# Patient Record
Sex: FEMALE | Race: WHITE | NOT HISPANIC OR LATINO | URBAN - METROPOLITAN AREA
[De-identification: names, ages, dates, MRNs, and addresses within clinical notes are randomized per-mention and may not be internally consistent; named-entity substitution may affect disease eponyms.]

---

## 2017-04-03 ENCOUNTER — OUTPATIENT (OUTPATIENT)
Dept: OUTPATIENT SERVICES | Facility: HOSPITAL | Age: 82
LOS: 1 days | Discharge: HOME | End: 2017-04-03

## 2017-06-27 DIAGNOSIS — I25.10 ATHEROSCLEROTIC HEART DISEASE OF NATIVE CORONARY ARTERY WITHOUT ANGINA PECTORIS: ICD-10-CM

## 2017-06-27 DIAGNOSIS — Z01.810 ENCOUNTER FOR PREPROCEDURAL CARDIOVASCULAR EXAMINATION: ICD-10-CM

## 2017-06-27 DIAGNOSIS — E78.00 PURE HYPERCHOLESTEROLEMIA, UNSPECIFIED: ICD-10-CM

## 2017-06-27 DIAGNOSIS — Z79.899 OTHER LONG TERM (CURRENT) DRUG THERAPY: ICD-10-CM

## 2017-07-10 ENCOUNTER — OUTPATIENT (OUTPATIENT)
Dept: OUTPATIENT SERVICES | Facility: HOSPITAL | Age: 82
LOS: 1 days | Discharge: HOME | End: 2017-07-10

## 2017-07-10 DIAGNOSIS — Z12.31 ENCOUNTER FOR SCREENING MAMMOGRAM FOR MALIGNANT NEOPLASM OF BREAST: ICD-10-CM

## 2017-07-10 DIAGNOSIS — R00.1 BRADYCARDIA, UNSPECIFIED: ICD-10-CM

## 2017-07-12 ENCOUNTER — OUTPATIENT (OUTPATIENT)
Dept: OUTPATIENT SERVICES | Facility: HOSPITAL | Age: 82
LOS: 1 days | Discharge: HOME | End: 2017-07-12

## 2017-07-12 DIAGNOSIS — R00.1 BRADYCARDIA, UNSPECIFIED: ICD-10-CM

## 2017-07-13 DIAGNOSIS — N39.0 URINARY TRACT INFECTION, SITE NOT SPECIFIED: ICD-10-CM

## 2017-10-04 ENCOUNTER — OUTPATIENT (OUTPATIENT)
Dept: OUTPATIENT SERVICES | Facility: HOSPITAL | Age: 82
LOS: 1 days | Discharge: HOME | End: 2017-10-04

## 2017-10-04 DIAGNOSIS — Z79.899 OTHER LONG TERM (CURRENT) DRUG THERAPY: ICD-10-CM

## 2017-10-04 DIAGNOSIS — E78.00 PURE HYPERCHOLESTEROLEMIA, UNSPECIFIED: ICD-10-CM

## 2017-10-04 DIAGNOSIS — R00.1 BRADYCARDIA, UNSPECIFIED: ICD-10-CM

## 2017-10-04 DIAGNOSIS — I25.10 ATHEROSCLEROTIC HEART DISEASE OF NATIVE CORONARY ARTERY WITHOUT ANGINA PECTORIS: ICD-10-CM

## 2017-10-04 DIAGNOSIS — Z01.810 ENCOUNTER FOR PREPROCEDURAL CARDIOVASCULAR EXAMINATION: ICD-10-CM

## 2017-12-28 ENCOUNTER — RX RENEWAL (OUTPATIENT)
Age: 82
End: 2017-12-28

## 2017-12-28 DIAGNOSIS — N32.81 OVERACTIVE BLADDER: ICD-10-CM

## 2017-12-28 PROBLEM — Z00.00 ENCOUNTER FOR PREVENTIVE HEALTH EXAMINATION: Status: ACTIVE | Noted: 2017-12-28

## 2018-02-01 ENCOUNTER — APPOINTMENT (OUTPATIENT)
Dept: OTOLARYNGOLOGY | Facility: CLINIC | Age: 83
End: 2018-02-01

## 2018-04-03 ENCOUNTER — OUTPATIENT (OUTPATIENT)
Dept: OUTPATIENT SERVICES | Facility: HOSPITAL | Age: 83
LOS: 1 days | Discharge: HOME | End: 2018-04-03

## 2018-04-03 DIAGNOSIS — E78.5 HYPERLIPIDEMIA, UNSPECIFIED: ICD-10-CM

## 2018-04-09 ENCOUNTER — OUTPATIENT (OUTPATIENT)
Dept: OUTPATIENT SERVICES | Facility: HOSPITAL | Age: 83
LOS: 1 days | Discharge: HOME | End: 2018-04-09

## 2018-04-09 DIAGNOSIS — Z01.810 ENCOUNTER FOR PREPROCEDURAL CARDIOVASCULAR EXAMINATION: ICD-10-CM

## 2018-04-09 DIAGNOSIS — Z79.899 OTHER LONG TERM (CURRENT) DRUG THERAPY: ICD-10-CM

## 2018-04-09 DIAGNOSIS — I25.10 ATHEROSCLEROTIC HEART DISEASE OF NATIVE CORONARY ARTERY WITHOUT ANGINA PECTORIS: ICD-10-CM

## 2018-04-09 DIAGNOSIS — E78.00 PURE HYPERCHOLESTEROLEMIA, UNSPECIFIED: ICD-10-CM

## 2018-04-26 ENCOUNTER — APPOINTMENT (OUTPATIENT)
Dept: OTOLARYNGOLOGY | Facility: CLINIC | Age: 83
End: 2018-04-26
Payer: MEDICARE

## 2018-04-26 VITALS
HEIGHT: 64 IN | DIASTOLIC BLOOD PRESSURE: 90 MMHG | SYSTOLIC BLOOD PRESSURE: 128 MMHG | BODY MASS INDEX: 21.34 KG/M2 | WEIGHT: 125 LBS

## 2018-04-26 DIAGNOSIS — R42 DIZZINESS AND GIDDINESS: ICD-10-CM

## 2018-04-26 DIAGNOSIS — Z78.9 OTHER SPECIFIED HEALTH STATUS: ICD-10-CM

## 2018-04-26 DIAGNOSIS — H91.90 UNSPECIFIED HEARING LOSS, UNSPECIFIED EAR: ICD-10-CM

## 2018-04-26 DIAGNOSIS — H61.20 IMPACTED CERUMEN, UNSPECIFIED EAR: ICD-10-CM

## 2018-04-26 PROCEDURE — 99203 OFFICE O/P NEW LOW 30 MIN: CPT | Mod: 25

## 2018-04-26 PROCEDURE — 69210 REMOVE IMPACTED EAR WAX UNI: CPT

## 2018-04-26 RX ORDER — FAMOTIDINE 40 MG/1
40 TABLET, FILM COATED ORAL
Qty: 90 | Refills: 0 | Status: ACTIVE | COMMUNITY
Start: 2018-03-29

## 2018-04-26 RX ORDER — HYDROCHLOROTHIAZIDE 12.5 MG/1
12.5 CAPSULE ORAL
Qty: 30 | Refills: 0 | Status: ACTIVE | COMMUNITY
Start: 2018-04-23

## 2018-04-26 RX ORDER — LISINOPRIL 40 MG/1
40 TABLET ORAL
Qty: 90 | Refills: 0 | Status: ACTIVE | COMMUNITY
Start: 2018-02-21

## 2018-04-26 RX ORDER — ATORVASTATIN CALCIUM 10 MG/1
10 TABLET, FILM COATED ORAL
Qty: 90 | Refills: 0 | Status: ACTIVE | COMMUNITY
Start: 2018-02-07

## 2018-04-26 RX ORDER — METOPROLOL TARTRATE 25 MG/1
25 TABLET, FILM COATED ORAL
Qty: 180 | Refills: 0 | Status: ACTIVE | COMMUNITY
Start: 2017-05-03 | End: 1900-01-01

## 2018-04-26 RX ORDER — MECLIZINE HYDROCHLORIDE 25 MG/1
25 TABLET ORAL
Qty: 60 | Refills: 0 | Status: ACTIVE | COMMUNITY
Start: 2018-03-26

## 2018-04-27 ENCOUNTER — APPOINTMENT (OUTPATIENT)
Dept: OTOLARYNGOLOGY | Facility: CLINIC | Age: 83
End: 2018-04-27

## 2018-05-29 ENCOUNTER — OUTPATIENT (OUTPATIENT)
Dept: OUTPATIENT SERVICES | Facility: HOSPITAL | Age: 83
LOS: 1 days | Discharge: HOME | End: 2018-05-29

## 2018-05-30 DIAGNOSIS — H53.2 DIPLOPIA: ICD-10-CM

## 2018-05-30 DIAGNOSIS — H50.53 VERTICAL HETEROPHORIA: ICD-10-CM

## 2018-05-30 DIAGNOSIS — H25.813 COMBINED FORMS OF AGE-RELATED CATARACT, BILATERAL: ICD-10-CM

## 2018-06-06 ENCOUNTER — APPOINTMENT (OUTPATIENT)
Dept: OTOLARYNGOLOGY | Facility: CLINIC | Age: 83
End: 2018-06-06

## 2018-08-27 ENCOUNTER — OUTPATIENT (OUTPATIENT)
Dept: OUTPATIENT SERVICES | Facility: HOSPITAL | Age: 83
LOS: 1 days | Discharge: HOME | End: 2018-08-27

## 2018-08-27 DIAGNOSIS — I25.10 ATHEROSCLEROTIC HEART DISEASE OF NATIVE CORONARY ARTERY WITHOUT ANGINA PECTORIS: ICD-10-CM

## 2018-08-27 DIAGNOSIS — Z01.810 ENCOUNTER FOR PREPROCEDURAL CARDIOVASCULAR EXAMINATION: ICD-10-CM

## 2018-08-27 DIAGNOSIS — E78.00 PURE HYPERCHOLESTEROLEMIA, UNSPECIFIED: ICD-10-CM

## 2018-08-27 DIAGNOSIS — Z79.899 OTHER LONG TERM (CURRENT) DRUG THERAPY: ICD-10-CM

## 2018-08-29 ENCOUNTER — OUTPATIENT (OUTPATIENT)
Dept: OUTPATIENT SERVICES | Facility: HOSPITAL | Age: 83
LOS: 1 days | Discharge: HOME | End: 2018-08-29

## 2018-12-02 ENCOUNTER — TRANSCRIPTION ENCOUNTER (OUTPATIENT)
Age: 83
End: 2018-12-02

## 2019-02-23 ENCOUNTER — TRANSCRIPTION ENCOUNTER (OUTPATIENT)
Age: 84
End: 2019-02-23

## 2019-03-08 ENCOUNTER — RX RENEWAL (OUTPATIENT)
Age: 84
End: 2019-03-08

## 2019-05-03 ENCOUNTER — TRANSCRIPTION ENCOUNTER (OUTPATIENT)
Age: 84
End: 2019-05-03

## 2019-06-10 ENCOUNTER — RX RENEWAL (OUTPATIENT)
Age: 84
End: 2019-06-10

## 2019-07-01 ENCOUNTER — OUTPATIENT (OUTPATIENT)
Dept: OUTPATIENT SERVICES | Facility: HOSPITAL | Age: 84
LOS: 1 days | Discharge: HOME | End: 2019-07-01

## 2019-07-01 DIAGNOSIS — K02.63 DENTAL CARIES ON SMOOTH SURFACE PENETRATING INTO PULP: ICD-10-CM

## 2019-07-26 ENCOUNTER — OUTPATIENT (OUTPATIENT)
Dept: OUTPATIENT SERVICES | Facility: HOSPITAL | Age: 84
LOS: 1 days | Discharge: HOME | End: 2019-07-26

## 2019-08-15 ENCOUNTER — OUTPATIENT (OUTPATIENT)
Dept: OUTPATIENT SERVICES | Facility: HOSPITAL | Age: 84
LOS: 1 days | Discharge: HOME | End: 2019-08-15

## 2019-08-15 DIAGNOSIS — R39.15 URGENCY OF URINATION: ICD-10-CM

## 2019-08-15 DIAGNOSIS — R42 DIZZINESS AND GIDDINESS: ICD-10-CM

## 2019-08-15 DIAGNOSIS — K22.70 BARRETT'S ESOPHAGUS WITHOUT DYSPLASIA: ICD-10-CM

## 2019-08-15 DIAGNOSIS — E78.5 HYPERLIPIDEMIA, UNSPECIFIED: ICD-10-CM

## 2019-09-16 ENCOUNTER — TRANSCRIPTION ENCOUNTER (OUTPATIENT)
Age: 84
End: 2019-09-16

## 2020-07-10 ENCOUNTER — RX RENEWAL (OUTPATIENT)
Age: 85
End: 2020-07-10

## 2020-09-13 ENCOUNTER — TRANSCRIPTION ENCOUNTER (OUTPATIENT)
Age: 85
End: 2020-09-13

## 2020-10-12 ENCOUNTER — RX RENEWAL (OUTPATIENT)
Age: 85
End: 2020-10-12

## 2021-01-12 ENCOUNTER — RX RENEWAL (OUTPATIENT)
Age: 86
End: 2021-01-12

## 2021-01-28 ENCOUNTER — RX RENEWAL (OUTPATIENT)
Age: 86
End: 2021-01-28

## 2021-07-11 ENCOUNTER — EMERGENCY (EMERGENCY)
Facility: HOSPITAL | Age: 86
LOS: 0 days | Discharge: HOME | End: 2021-07-11
Attending: EMERGENCY MEDICINE | Admitting: EMERGENCY MEDICINE
Payer: MEDICARE

## 2021-07-11 VITALS
RESPIRATION RATE: 18 BRPM | OXYGEN SATURATION: 98 % | DIASTOLIC BLOOD PRESSURE: 105 MMHG | HEART RATE: 76 BPM | TEMPERATURE: 99 F | SYSTOLIC BLOOD PRESSURE: 221 MMHG

## 2021-07-11 VITALS
HEART RATE: 80 BPM | TEMPERATURE: 99 F | DIASTOLIC BLOOD PRESSURE: 87 MMHG | SYSTOLIC BLOOD PRESSURE: 188 MMHG | RESPIRATION RATE: 18 BRPM | OXYGEN SATURATION: 97 %

## 2021-07-11 DIAGNOSIS — S01.81XA LACERATION WITHOUT FOREIGN BODY OF OTHER PART OF HEAD, INITIAL ENCOUNTER: ICD-10-CM

## 2021-07-11 DIAGNOSIS — E78.5 HYPERLIPIDEMIA, UNSPECIFIED: ICD-10-CM

## 2021-07-11 DIAGNOSIS — Z23 ENCOUNTER FOR IMMUNIZATION: ICD-10-CM

## 2021-07-11 DIAGNOSIS — I10 ESSENTIAL (PRIMARY) HYPERTENSION: ICD-10-CM

## 2021-07-11 DIAGNOSIS — S02.2XXA FRACTURE OF NASAL BONES, INITIAL ENCOUNTER FOR CLOSED FRACTURE: ICD-10-CM

## 2021-07-11 DIAGNOSIS — S41.011A LACERATION WITHOUT FOREIGN BODY OF RIGHT SHOULDER, INITIAL ENCOUNTER: ICD-10-CM
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
## 2021-07-11 LAB
ALBUMIN SERPL ELPH-MCNC: 4.5 G/DL — SIGNIFICANT CHANGE UP (ref 3.5–5.2)
ALP SERPL-CCNC: 91 U/L — SIGNIFICANT CHANGE UP (ref 30–115)
ALT FLD-CCNC: 6 U/L — SIGNIFICANT CHANGE UP (ref 0–41)
ANION GAP SERPL CALC-SCNC: 11 MMOL/L — SIGNIFICANT CHANGE UP (ref 7–14)
APTT BLD: 23.9 SEC — CRITICAL LOW (ref 27–39.2)
AST SERPL-CCNC: 16 U/L — SIGNIFICANT CHANGE UP (ref 0–41)
BASOPHILS # BLD AUTO: 0.06 K/UL — SIGNIFICANT CHANGE UP (ref 0–0.2)
BASOPHILS NFR BLD AUTO: 0.7 % — SIGNIFICANT CHANGE UP (ref 0–1)
BILIRUB SERPL-MCNC: <0.2 MG/DL — SIGNIFICANT CHANGE UP (ref 0.2–1.2)
BUN SERPL-MCNC: 24 MG/DL — HIGH (ref 10–20)
CALCIUM SERPL-MCNC: 9.7 MG/DL — SIGNIFICANT CHANGE UP (ref 8.5–10.1)
CHLORIDE SERPL-SCNC: 106 MMOL/L — SIGNIFICANT CHANGE UP (ref 98–110)
CO2 SERPL-SCNC: 25 MMOL/L — SIGNIFICANT CHANGE UP (ref 17–32)
CREAT SERPL-MCNC: 1.1 MG/DL — SIGNIFICANT CHANGE UP (ref 0.7–1.5)
EOSINOPHIL # BLD AUTO: 0.21 K/UL — SIGNIFICANT CHANGE UP (ref 0–0.7)
EOSINOPHIL NFR BLD AUTO: 2.3 % — SIGNIFICANT CHANGE UP (ref 0–8)
GLUCOSE SERPL-MCNC: 163 MG/DL — HIGH (ref 70–99)
HCT VFR BLD CALC: 34.6 % — LOW (ref 37–47)
HGB BLD-MCNC: 11.2 G/DL — LOW (ref 12–16)
IMM GRANULOCYTES NFR BLD AUTO: 0.4 % — HIGH (ref 0.1–0.3)
INR BLD: 0.97 RATIO — SIGNIFICANT CHANGE UP (ref 0.65–1.3)
LACTATE SERPL-SCNC: 1.7 MMOL/L — SIGNIFICANT CHANGE UP (ref 0.7–2)
LIDOCAIN IGE QN: 28 U/L — SIGNIFICANT CHANGE UP (ref 7–60)
LYMPHOCYTES # BLD AUTO: 1.35 K/UL — SIGNIFICANT CHANGE UP (ref 1.2–3.4)
LYMPHOCYTES # BLD AUTO: 15.1 % — LOW (ref 20.5–51.1)
MCHC RBC-ENTMCNC: 26.4 PG — LOW (ref 27–31)
MCHC RBC-ENTMCNC: 32.4 G/DL — SIGNIFICANT CHANGE UP (ref 32–37)
MCV RBC AUTO: 81.6 FL — SIGNIFICANT CHANGE UP (ref 81–99)
MONOCYTES # BLD AUTO: 0.76 K/UL — HIGH (ref 0.1–0.6)
MONOCYTES NFR BLD AUTO: 8.5 % — SIGNIFICANT CHANGE UP (ref 1.7–9.3)
NEUTROPHILS # BLD AUTO: 6.55 K/UL — HIGH (ref 1.4–6.5)
NEUTROPHILS NFR BLD AUTO: 73 % — SIGNIFICANT CHANGE UP (ref 42.2–75.2)
NRBC # BLD: 0 /100 WBCS — SIGNIFICANT CHANGE UP (ref 0–0)
PLATELET # BLD AUTO: 220 K/UL — SIGNIFICANT CHANGE UP (ref 130–400)
POTASSIUM SERPL-MCNC: 3.6 MMOL/L — SIGNIFICANT CHANGE UP (ref 3.5–5)
POTASSIUM SERPL-SCNC: 3.6 MMOL/L — SIGNIFICANT CHANGE UP (ref 3.5–5)
PROT SERPL-MCNC: 6.9 G/DL — SIGNIFICANT CHANGE UP (ref 6–8)
PROTHROM AB SERPL-ACNC: 11.1 SEC — SIGNIFICANT CHANGE UP (ref 9.95–12.87)
RBC # BLD: 4.24 M/UL — SIGNIFICANT CHANGE UP (ref 4.2–5.4)
RBC # FLD: 14.4 % — SIGNIFICANT CHANGE UP (ref 11.5–14.5)
SODIUM SERPL-SCNC: 142 MMOL/L — SIGNIFICANT CHANGE UP (ref 135–146)
WBC # BLD: 8.97 K/UL — SIGNIFICANT CHANGE UP (ref 4.8–10.8)
WBC # FLD AUTO: 8.97 K/UL — SIGNIFICANT CHANGE UP (ref 4.8–10.8)

## 2021-07-11 PROCEDURE — 12011 RPR F/E/E/N/L/M 2.5 CM/<: CPT | Mod: 59

## 2021-07-11 PROCEDURE — 76705 ECHO EXAM OF ABDOMEN: CPT | Mod: 26

## 2021-07-11 PROCEDURE — 73502 X-RAY EXAM HIP UNI 2-3 VIEWS: CPT | Mod: 26,RT

## 2021-07-11 PROCEDURE — 99285 EMERGENCY DEPT VISIT HI MDM: CPT | Mod: 25

## 2021-07-11 PROCEDURE — 73552 X-RAY EXAM OF FEMUR 2/>: CPT | Mod: 26,RT

## 2021-07-11 PROCEDURE — 72125 CT NECK SPINE W/O DYE: CPT | Mod: 26,MA

## 2021-07-11 PROCEDURE — 73060 X-RAY EXAM OF HUMERUS: CPT | Mod: 26,RT

## 2021-07-11 PROCEDURE — 93308 TTE F-UP OR LMTD: CPT | Mod: 26

## 2021-07-11 PROCEDURE — 73110 X-RAY EXAM OF WRIST: CPT | Mod: 26,RT

## 2021-07-11 PROCEDURE — 71045 X-RAY EXAM CHEST 1 VIEW: CPT | Mod: 26

## 2021-07-11 PROCEDURE — 76604 US EXAM CHEST: CPT | Mod: 26

## 2021-07-11 PROCEDURE — 73080 X-RAY EXAM OF ELBOW: CPT | Mod: 26,RT

## 2021-07-11 PROCEDURE — 71260 CT THORAX DX C+: CPT | Mod: 26,MA

## 2021-07-11 PROCEDURE — 93010 ELECTROCARDIOGRAM REPORT: CPT

## 2021-07-11 PROCEDURE — 73090 X-RAY EXAM OF FOREARM: CPT | Mod: 26,RT

## 2021-07-11 PROCEDURE — 72170 X-RAY EXAM OF PELVIS: CPT | Mod: 26,59

## 2021-07-11 PROCEDURE — 70450 CT HEAD/BRAIN W/O DYE: CPT | Mod: 26,MA

## 2021-07-11 PROCEDURE — 74177 CT ABD & PELVIS W/CONTRAST: CPT | Mod: 26,MA

## 2021-07-11 PROCEDURE — 70486 CT MAXILLOFACIAL W/O DYE: CPT | Mod: 26,MA

## 2021-07-11 RX ORDER — ACETAMINOPHEN 500 MG
650 TABLET ORAL ONCE
Refills: 0 | Status: COMPLETED | OUTPATIENT
Start: 2021-07-11 | End: 2021-07-11

## 2021-07-11 RX ORDER — TETANUS TOXOID, REDUCED DIPHTHERIA TOXOID AND ACELLULAR PERTUSSIS VACCINE, ADSORBED 5; 2.5; 8; 8; 2.5 [IU]/.5ML; [IU]/.5ML; UG/.5ML; UG/.5ML; UG/.5ML
0.5 SUSPENSION INTRAMUSCULAR ONCE
Refills: 0 | Status: COMPLETED | OUTPATIENT
Start: 2021-07-11 | End: 2021-07-11

## 2021-07-11 RX ADMIN — TETANUS TOXOID, REDUCED DIPHTHERIA TOXOID AND ACELLULAR PERTUSSIS VACCINE, ADSORBED 0.5 MILLILITER(S): 5; 2.5; 8; 8; 2.5 SUSPENSION INTRAMUSCULAR at 18:54

## 2021-07-11 RX ADMIN — Medication 650 MILLIGRAM(S): at 21:30

## 2021-07-11 NOTE — ED PROVIDER NOTE - NS ED ROS FT
Constitutional:  See HPI  Eyes:  No visual changes  ENMT: + nose pain, bruising, swelling. No neck pain or stiffness  Cardiac:  + right lower anterior chest wall pain. No retrosternal chest pain  Respiratory:  No cough or respiratory distress.   GI:  No nausea, vomiting, diarrhea or abdominal pain.  :  No dysuria, frequency or burning.  MS:  + R humerus, lower arm/wrist/hand pain. + R hip/thigh pain. No back pain.  Neuro:  No headache   Skin:  + laceration to R forehead, + multiple skin tears to RUE. No skin rash  Except as documented in the HPI,  all other systems are negative

## 2021-07-11 NOTE — ED PROVIDER NOTE - PROGRESS NOTE DETAILS
pt given ct results. will f/u as needed. pt ambulating, pain controlled, vitals normal.  pt's family agrees with plan.

## 2021-07-11 NOTE — ED ADULT TRIAGE NOTE - CHIEF COMPLAINT QUOTE
Pt stood up at home and fell, +HT. Pt reports it happened so fast she doesn't remember if she was dizzy. Laceration above left eye, abrasions to right arm. No blood thinners.

## 2021-07-11 NOTE — ED PROVIDER NOTE - PATIENT PORTAL LINK FT
You can access the FollowMyHealth Patient Portal offered by [facility] by registering at the following website: [email or web address]. By joining [employer]’s FollowMyHealth portal, you will also be able to view your health information using other applications (apps) compatible with our system.

## 2021-07-11 NOTE — ED PROVIDER NOTE - ATTENDING CONTRIBUTION TO CARE
89 yo F pmh of htn, hld presents after a mechnical fall. Patient was at home asleep in a recliner chair when the phone rang. The patient got up to walk to the kitchen and tripped on the lip on the floor into the kitchen. Fell onto her right side. + head trauma, unknown loc. + laceration to the right forhead. Having pain to the right ribs. no headache, no dizziness, no chest pain, no shortness of breath, no palpitations. no n/v, no numbness, tingling or wekaness. no blood thinning medications.     CONSTITUTIONAL: Well-developed; well-nourished; in no acute distress.   SKIN: warm, dry. + skin tears to the right arm. + echymosis to the right thumb. 1cm laceration to the right forhead. echymosis to the nasal bridge.   HEAD: Normocephalic; + hematoma to the right forhead.   EYES: PERRL, EOMI, no conjunctival erythema  ENT: No nasal discharge; airway clear. no septal hematoma, no hemotynpanum.   NECK: Supple; non tender.  CARD: S1, S2 normal;  Regular rate and rhythm.   RESP: No wheezes, rales or rhonchi. + right rib tenderness, no crepitus.   ABD: soft non tender, non distended, no rebound or guarding  EXT: Normal ROM.  5/5 strength in all 4 extremities   LYMPH: No acute cervical adenopathy.  NEURO: Alert, oriented, grossly unremarkable. neurovascularly intact  PSYCH: Cooperative, appropriate.

## 2021-07-11 NOTE — ED PROCEDURE NOTE - POC US FAST ED US FINDINGS
no pneumothoraces/No intra-abdominal or pericardial free fluid/Other neg efast/No intra-abdominal or pericardial free fluid/Other

## 2021-07-11 NOTE — ED PROVIDER NOTE - CLINICAL SUMMARY MEDICAL DECISION MAKING FREE TEXT BOX
Patient presents after a mechnical fall. labs, xray, ct done. Found to have hematoma and nasal bone fx. Laceration repaired, tdap given. Incidental findings on ct scan and all findings discussed and printed for the patient. Patient able to ambulate at her baseline. Discharged with pmd follow up and return precautions.

## 2021-07-11 NOTE — ED PROCEDURE NOTE - ATTENDING CONTRIBUTION TO CARE
I personally supervised the study.  I reviewed the images and interpretation by the resident/ACP and have edited where appropriate.
I personally supervised the study.  I reviewed the images and interpretation by the resident/ACP and have edited where appropriate.
I was present for and supervised the key and critical aspects of the procedures performed during the care of the patient.

## 2021-07-11 NOTE — ED PROVIDER NOTE - OBJECTIVE STATEMENT
Pt is a 89F with a pmhx of HTN and HLD (not on blood thinner) presenting after a mechnical fall. Patient was at home asleep in a recliner chair when the phone rang. The patient got up to walk to the kitchen and tripped on the lip on the floor into the kitchen. Fell onto her right side. + head trauma, unknown loc. + laceration to the right forhead. Having pain to the right ribs. no headache, no dizziness, no chest pain, no shortness of breath, no palpitations. no n/v, no numbness, tingling or weakness.

## 2021-07-11 NOTE — ED PROVIDER NOTE - NSFOLLOWUPINSTRUCTIONS_ED_ALL_ED_FT
Nasal Fracture    A nasal fracture is a break or crack in the bones or cartilage of the nose. Minor breaks do not require treatment. These breaks usually heal on their own after about one month. Serious breaks may require surgery.     CAUSES  This injury is usually caused by a blunt injury to the nose. This type of injury often occurs from:    Contact sports.  Car accidents.  Falls.  Getting punched.    SYMPTOMS  Symptoms of this injury include:    Pain.  Swelling of the nose.  Bleeding from the nose.  Bruising around the nose or eyes. This may include having black eyes.  Crooked appearance of the nose.    DIAGNOSIS  This injury may be diagnosed with a physical exam. The health care provider will gently feel the nose for signs of broken bones. He or she will look inside the nostrils to make sure that there is not a blood-filled swelling on the dividing wall between the nostrils (septal hematoma). X-rays of the nose may not show a nasal fracture even when one is present. In some cases, X-rays or a CT scan may be done 1–5 days after the injury. Sometimes, the health care provider will want to wait until the swelling has gone down.    TREATMENT  Often, minor fractures that have caused no deformity do not require treatment. More serious fractures in which bones have moved out of position may require surgery, which will take place after the swelling is gone. Surgery will stabilize and align the fracture. In some cases, a health care provider may be able to reposition the bones without surgery. This may be done in the health care provider's office after medicine is given to numb the area (local anesthetic).    HOME CARE INSTRUCTIONS  If directed, apply ice to the injured area:  Put ice in a plastic bag.  Place a towel between your skin and the bag.  Leave the ice on for 20 minutes, 2–3 times per day.  Take over-the-counter and prescription medicines only as told by your health care provider.  If your nose starts to bleed, sit in an upright position while you squeeze the soft parts of your nose against the dividing wall between your nostrils (septum) for 10 minutes.  Try to avoid blowing your nose.  Return to your normal activities as told by your health care provider. Ask your health care provider what activities are safe for you.  Avoid contact sports for 3–4 weeks or as told by your health care provider.  Keep all follow-up visits as told by your health care provider. This is important.    SEEK MEDICAL CARE IF:  Your pain increases or becomes severe.  You continue to have nosebleeds.  The shape of your nose does not return to normal within 5 days.  You have pus draining out of your nose.    SEEK IMMEDIATE MEDICAL CARE IF:  You have bleeding from your nose that does not stop after you pinch your nostrils closed for 20 minutes and keep ice on your nose.  You have clear fluid draining out of your nose.  You notice a grape-like swelling on the septum. This swelling is a collection of blood (hematoma) that must be drained to help prevent infection.  You have difficulty moving your eyes.  You have repeated vomiting.    ADDITIONAL NOTES AND INSTRUCTIONS    Please follow up with your Primary MD in 24-48 hr.  Seek immediate medical care for any new/worsening signs or symptoms.     Fall Prevention in the Home, Adult  Falls can cause injuries. They can happen to people of all ages. There are many things you can do to make your home safe and to help prevent falls. Ask for help when making these changes, if needed.    What actions can I take to prevent falls?  General Instructions     Use good lighting in all rooms. Replace any light bulbs that burn out.  Turn on the lights when you go into a dark area. Use night-lights.  Keep items that you use often in easy-to-reach places. Lower the shelves around your home if necessary.  Set up your furniture so you have a clear path. Avoid moving your furniture around.  Do not have throw rugs and other things on the floor that can make you trip.  Avoid walking on wet floors.  If any of your floors are uneven, fix them.  Add color or contrast paint or tape to clearly christian and help you see:  Any grab bars or handrails.  First and last steps of stairways.  Where the edge of each step is.  If you use a stepladder:  Make sure that it is fully opened. Do not climb a closed stepladder.  Make sure that both sides of the stepladder are locked into place.  Ask someone to hold the stepladder for you while you use it.  If there are any pets around you, be aware of where they are.  What can I do in the bathroom?     Keep the floor dry. Clean up any water that spills onto the floor as soon as it happens.  Remove soap buildup in the tub or shower regularly.  Use non-skid mats or decals on the floor of the tub or shower.  Attach bath mats securely with double-sided, non-slip rug tape.  If you need to sit down in the shower, use a plastic, non-slip stool.  Install grab bars by the toilet and in the tub and shower. Do not use towel bars as grab bars.  What can I do in the bedroom?     Make sure that you have a light by your bed that is easy to reach.  Do not use any sheets or blankets that are too big for your bed. They should not hang down onto the floor.  Have a firm chair that has side arms. You can use this for support while you get dressed.  What can I do in the kitchen?     Clean up any spills right away.  If you need to reach something above you, use a strong step stool that has a grab bar.  Keep electrical cords out of the way.  Do not use floor polish or wax that makes floors slippery. If you must use wax, use non-skid floor wax.  What can I do with my stairs?     Do not leave any items on the stairs.  Make sure that you have a light switch at the top of the stairs and the bottom of the stairs. If you do not have them, ask someone to add them for you.  Make sure that there are handrails on both sides of the stairs, and use them. Fix handrails that are broken or loose. Make sure that handrails are as long as the stairways.  Install non-slip stair treads on all stairs in your home.  Avoid having throw rugs at the top or bottom of the stairs. If you do have throw rugs, attach them to the floor with carpet tape.  Choose a carpet that does not hide the edge of the steps on the stairway.  Check any carpeting to make sure that it is firmly attached to the stairs. Fix any carpet that is loose or worn.  What can I do on the outside of my home?     Use bright outdoor lighting.  Regularly fix the edges of walkways and driveways and fix any cracks.  Remove anything that might make you trip as you walk through a door, such as a raised step or threshold.  Trim any bushes or trees on the path to your home.  Regularly check to see if handrails are loose or broken. Make sure that both sides of any steps have handrails.  Install guardrails along the edges of any raised decks and porches.  Clear walking paths of anything that might make someone trip, such as tools or rocks.  Have any leaves, snow, or ice cleared regularly.  Use sand or salt on walking paths during winter.  Clean up any spills in your garage right away. This includes grease or oil spills.  What other actions can I take?     Wear shoes that:  Have a low heel. Do not wear high heels.  Have rubber bottoms.  Are comfortable and fit you well.  Are closed at the toe. Do not wear open-toe sandals.  Use tools that help you move around (mobility aids) if they are needed. These include:  Canes.  Walkers.  Scooters.  Crutches.  Review your medicines with your doctor. Some medicines can make you feel dizzy. This can increase your chance of falling.  Ask your doctor what other things you can do to help prevent falls.    Where to find more information  Centers for Disease Control and PreventionHARRIET: https://cdc.gov  [employer] Aging: https://rn9ybox.juan ramon.nih.gov  Contact a doctor if:  You are afraid of falling at home.  You feel weak, drowsy, or dizzy at home.  You fall at home.  Summary  There are many simple things that you can do to make your home safe and to help prevent falls.  Ways to make your home safe include removing tripping hazards and installing grab bars in the bathroom.  Ask for help when making these changes in your home.  This information is not intended to replace advice given to you by your health care provider. Make sure you discuss any questions you have with your health care provider.

## 2021-07-11 NOTE — ED PROVIDER NOTE - WET READ LAUNCH FT
There are 2 Wet Read(s) to document. There is 1 Wet Read(s) to document. There are 3 Wet Read(s) to document.

## 2021-07-11 NOTE — ED PROVIDER NOTE - PMH
Bilateral hearing loss, unspecified hearing loss type  reads lips proficiently  HLD (hyperlipidemia)    HTN (hypertension)

## 2021-07-11 NOTE — ED PROVIDER NOTE - CARE PROVIDER_API CALL
Arsenio Camarena)  Geriatric Medicine; Internal Medicine  [address] [place] [address]  Phone: [phone]  Fax: [phone]  Follow Up Time: 1-3 Days

## 2021-07-28 ENCOUNTER — RX RENEWAL (OUTPATIENT)
Age: 86
End: 2021-07-28

## 2021-07-28 RX ORDER — OXYBUTYNIN CHLORIDE 15 MG/1
15 TABLET, EXTENDED RELEASE ORAL
Qty: 90 | Refills: 3 | Status: ACTIVE | COMMUNITY
Start: 2017-12-28 | End: 1900-01-01

## 2022-04-01 PROBLEM — E78.5 HYPERLIPIDEMIA, UNSPECIFIED: Chronic | Status: ACTIVE | Noted: 2021-07-11

## 2022-04-01 PROBLEM — H91.93 UNSPECIFIED HEARING LOSS, BILATERAL: Chronic | Status: ACTIVE | Noted: 2021-07-11

## 2022-04-01 PROBLEM — I10 ESSENTIAL (PRIMARY) HYPERTENSION: Chronic | Status: ACTIVE | Noted: 2021-07-11

## 2022-06-08 ENCOUNTER — APPOINTMENT (OUTPATIENT)
Dept: OPHTHALMOLOGY | Facility: CLINIC | Age: 87
End: 2022-06-08
Payer: MEDICARE

## 2022-06-08 ENCOUNTER — OUTPATIENT (OUTPATIENT)
Dept: OUTPATIENT SERVICES | Facility: HOSPITAL | Age: 87
LOS: 1 days | Discharge: HOME | End: 2022-06-08

## 2022-06-08 PROCEDURE — 92136 OPHTHALMIC BIOMETRY: CPT | Mod: 26

## 2022-07-11 ENCOUNTER — OUTPATIENT (OUTPATIENT)
Dept: OUTPATIENT SERVICES | Facility: HOSPITAL | Age: 87
LOS: 1 days | Discharge: HOME | End: 2022-07-11

## 2022-07-11 VITALS
SYSTOLIC BLOOD PRESSURE: 217 MMHG | OXYGEN SATURATION: 98 % | DIASTOLIC BLOOD PRESSURE: 95 MMHG | WEIGHT: 139.99 LBS | HEIGHT: 67 IN | HEART RATE: 47 BPM | RESPIRATION RATE: 18 BRPM | TEMPERATURE: 98 F

## 2022-07-11 VITALS — SYSTOLIC BLOOD PRESSURE: 163 MMHG | HEART RATE: 88 BPM | RESPIRATION RATE: 17 BRPM | DIASTOLIC BLOOD PRESSURE: 72 MMHG

## 2022-07-11 NOTE — ASU DISCHARGE PLAN (ADULT/PEDIATRIC) - NS MD DC FALL RISK RISK
For information on Fall & Injury Prevention, visit: [email or web address] OR  [email or web address] OR  https://www.cdc.gov/steadi/patient.html

## 2022-07-11 NOTE — ASU PATIENT PROFILE, ADULT - FALL HARM RISK - HARM RISK INTERVENTIONS

## 2022-07-11 NOTE — CHART NOTE - NSCHARTNOTEFT_GEN_A_CORE
PACU ANESTHESIA ADMISSION NOTE      Procedure: Cataract extraction with IOL implant OD  Post op diagnosis:  Cataract right eye    ____  Intubated  TV:______       Rate: ______      FiO2: ______    __x__  Patent Airway    _x___  Full return of protective reflexes    ___x_  Full recovery from anesthesia / back to baseline     Vitals:   T: 37C           R: 16                 BP: 158/78                 Sat: 100%                   P: 50      Mental Status:  __x__ Awake   __x___ Alert   _____ Drowsy   _____ Sedated    Nausea/Vomiting:  ____ NO  ____x__Yes,   See Post - Op Orders          Pain Scale (0-10):  _____    Treatment: ____ None    __x__ See Post - Op/PCA Orders    Post - Operative Fluids:   ____ Oral   __x__ See Post - Op Orders    Plan: Discharge:   __x__Home       _____Floor     _____Critical Care    _____  Other:_________________    Comments:

## 2022-07-11 NOTE — ASU PATIENT PROFILE, ADULT - NSICDXPASTMEDICALHX_GEN_ALL_CORE_FT
PAST MEDICAL HISTORY:  Bilateral hearing loss, unspecified hearing loss type reads lips proficiently    Cataract     HLD (hyperlipidemia)     HTN (hypertension)     Major depression

## 2022-07-13 DIAGNOSIS — H26.8 OTHER SPECIFIED CATARACT: ICD-10-CM

## 2022-07-13 DIAGNOSIS — E78.5 HYPERLIPIDEMIA, UNSPECIFIED: ICD-10-CM

## 2022-07-13 DIAGNOSIS — I10 ESSENTIAL (PRIMARY) HYPERTENSION: ICD-10-CM

## 2022-07-13 DIAGNOSIS — Z79.82 LONG TERM (CURRENT) USE OF ASPIRIN: ICD-10-CM

## 2022-07-13 DIAGNOSIS — F32.9 MAJOR DEPRESSIVE DISORDER, SINGLE EPISODE, UNSPECIFIED: ICD-10-CM

## 2022-07-26 NOTE — ED PROCEDURE NOTE - NS_EDPROVIDERDISPOUSERTYPE_ED_A_ED
Attending Attestation (For Attendings USE Only)...
Deterioration of Condition En Route/Increased Pain/Transportation Risk (There is always a risk of traffic delays resulting in deterioration of condition.)

## 2023-01-24 ENCOUNTER — INPATIENT (INPATIENT)
Facility: HOSPITAL | Age: 88
LOS: 0 days | Discharge: HOSPICE | End: 2023-01-25
Attending: INTERNAL MEDICINE | Admitting: INTERNAL MEDICINE
Payer: MEDICARE

## 2023-01-24 VITALS
RESPIRATION RATE: 20 BRPM | TEMPERATURE: 98 F | SYSTOLIC BLOOD PRESSURE: 134 MMHG | OXYGEN SATURATION: 97 % | HEART RATE: 56 BPM | DIASTOLIC BLOOD PRESSURE: 88 MMHG

## 2023-01-24 PROBLEM — F32.9 MAJOR DEPRESSIVE DISORDER, SINGLE EPISODE, UNSPECIFIED: Chronic | Status: ACTIVE | Noted: 2022-07-11

## 2023-01-24 PROBLEM — H26.9 UNSPECIFIED CATARACT: Chronic | Status: ACTIVE | Noted: 2022-07-11

## 2023-01-24 LAB
ALBUMIN SERPL ELPH-MCNC: 3.7 G/DL — SIGNIFICANT CHANGE UP (ref 3.5–5.2)
ALP SERPL-CCNC: 888 U/L — HIGH (ref 30–115)
ALT FLD-CCNC: 158 U/L — HIGH (ref 0–41)
ANION GAP SERPL CALC-SCNC: 14 MMOL/L — SIGNIFICANT CHANGE UP (ref 7–14)
APPEARANCE UR: ABNORMAL
APTT BLD: 31.7 SEC — SIGNIFICANT CHANGE UP (ref 27–39.2)
AST SERPL-CCNC: 173 U/L — HIGH (ref 0–41)
BACTERIA # UR AUTO: NEGATIVE — SIGNIFICANT CHANGE UP
BASOPHILS # BLD AUTO: 0.11 K/UL — SIGNIFICANT CHANGE UP (ref 0–0.2)
BASOPHILS NFR BLD AUTO: 1.1 % — HIGH (ref 0–1)
BILIRUB DIRECT SERPL-MCNC: 7 MG/DL — HIGH (ref 0–0.3)
BILIRUB INDIRECT FLD-MCNC: 1.6 MG/DL — HIGH (ref 0.2–1.2)
BILIRUB SERPL-MCNC: 8.6 MG/DL — HIGH (ref 0.2–1.2)
BILIRUB UR-MCNC: ABNORMAL
BLD GP AB SCN SERPL QL: SIGNIFICANT CHANGE UP
BUN SERPL-MCNC: 22 MG/DL — HIGH (ref 10–20)
CALCIUM SERPL-MCNC: 10.2 MG/DL — SIGNIFICANT CHANGE UP (ref 8.4–10.5)
CHLORIDE SERPL-SCNC: 106 MMOL/L — SIGNIFICANT CHANGE UP (ref 98–110)
CO2 SERPL-SCNC: 20 MMOL/L — SIGNIFICANT CHANGE UP (ref 17–32)
COLOR SPEC: ABNORMAL
CREAT SERPL-MCNC: 1.1 MG/DL — SIGNIFICANT CHANGE UP (ref 0.7–1.5)
DIFF PNL FLD: ABNORMAL
EGFR: 47 ML/MIN/1.73M2 — LOW
EOSINOPHIL # BLD AUTO: 0.11 K/UL — SIGNIFICANT CHANGE UP (ref 0–0.7)
EOSINOPHIL NFR BLD AUTO: 1.1 % — SIGNIFICANT CHANGE UP (ref 0–8)
EPI CELLS # UR: 7 /HPF — HIGH (ref 0–5)
GLUCOSE SERPL-MCNC: 113 MG/DL — HIGH (ref 70–99)
GLUCOSE UR QL: NEGATIVE — SIGNIFICANT CHANGE UP
HCT VFR BLD CALC: 37.6 % — SIGNIFICANT CHANGE UP (ref 37–47)
HGB BLD-MCNC: 11.8 G/DL — LOW (ref 12–16)
HYALINE CASTS # UR AUTO: 63 /LPF — HIGH (ref 0–7)
IMM GRANULOCYTES NFR BLD AUTO: 0.4 % — HIGH (ref 0.1–0.3)
INR BLD: 1.04 RATIO — SIGNIFICANT CHANGE UP (ref 0.65–1.3)
KETONES UR-MCNC: NEGATIVE — SIGNIFICANT CHANGE UP
LEUKOCYTE ESTERASE UR-ACNC: ABNORMAL
LIDOCAIN IGE QN: 39 U/L — SIGNIFICANT CHANGE UP (ref 7–60)
LYMPHOCYTES # BLD AUTO: 1.28 K/UL — SIGNIFICANT CHANGE UP (ref 1.2–3.4)
LYMPHOCYTES # BLD AUTO: 13.4 % — LOW (ref 20.5–51.1)
MCHC RBC-ENTMCNC: 25.4 PG — LOW (ref 27–31)
MCHC RBC-ENTMCNC: 31.4 G/DL — LOW (ref 32–37)
MCV RBC AUTO: 81 FL — SIGNIFICANT CHANGE UP (ref 81–99)
MONOCYTES # BLD AUTO: 1.01 K/UL — HIGH (ref 0.1–0.6)
MONOCYTES NFR BLD AUTO: 10.5 % — HIGH (ref 1.7–9.3)
NEUTROPHILS # BLD AUTO: 7.03 K/UL — HIGH (ref 1.4–6.5)
NEUTROPHILS NFR BLD AUTO: 73.5 % — SIGNIFICANT CHANGE UP (ref 42.2–75.2)
NITRITE UR-MCNC: NEGATIVE — SIGNIFICANT CHANGE UP
NRBC # BLD: 0 /100 WBCS — SIGNIFICANT CHANGE UP (ref 0–0)
PH UR: 6 — SIGNIFICANT CHANGE UP (ref 5–8)
PLATELET # BLD AUTO: 436 K/UL — HIGH (ref 130–400)
POTASSIUM SERPL-MCNC: 3.8 MMOL/L — SIGNIFICANT CHANGE UP (ref 3.5–5)
POTASSIUM SERPL-SCNC: 3.8 MMOL/L — SIGNIFICANT CHANGE UP (ref 3.5–5)
PROT SERPL-MCNC: 6.8 G/DL — SIGNIFICANT CHANGE UP (ref 6–8)
PROT UR-MCNC: ABNORMAL
PROTHROM AB SERPL-ACNC: 11.9 SEC — SIGNIFICANT CHANGE UP (ref 9.95–12.87)
RBC # BLD: 4.64 M/UL — SIGNIFICANT CHANGE UP (ref 4.2–5.4)
RBC # FLD: 16.7 % — HIGH (ref 11.5–14.5)
RBC CASTS # UR COMP ASSIST: 37 /HPF — HIGH (ref 0–4)
SARS-COV-2 RNA SPEC QL NAA+PROBE: SIGNIFICANT CHANGE UP
SODIUM SERPL-SCNC: 140 MMOL/L — SIGNIFICANT CHANGE UP (ref 135–146)
SP GR SPEC: 1.03 — HIGH (ref 1.01–1.03)
UROBILINOGEN FLD QL: SIGNIFICANT CHANGE UP
WBC # BLD: 9.58 K/UL — SIGNIFICANT CHANGE UP (ref 4.8–10.8)
WBC # FLD AUTO: 9.58 K/UL — SIGNIFICANT CHANGE UP (ref 4.8–10.8)
WBC UR QL: 20 /HPF — HIGH (ref 0–5)

## 2023-01-24 PROCEDURE — 71045 X-RAY EXAM CHEST 1 VIEW: CPT | Mod: 26

## 2023-01-24 PROCEDURE — 74178 CT ABD&PLV WO CNTR FLWD CNTR: CPT | Mod: 26,MA

## 2023-01-24 PROCEDURE — 93010 ELECTROCARDIOGRAM REPORT: CPT

## 2023-01-24 PROCEDURE — 99285 EMERGENCY DEPT VISIT HI MDM: CPT

## 2023-01-24 PROCEDURE — 99223 1ST HOSP IP/OBS HIGH 75: CPT

## 2023-01-24 RX ORDER — ASPIRIN/CALCIUM CARB/MAGNESIUM 324 MG
1 TABLET ORAL
Qty: 0 | Refills: 0 | DISCHARGE

## 2023-01-24 RX ORDER — LISINOPRIL 2.5 MG/1
20 TABLET ORAL DAILY
Refills: 0 | Status: DISCONTINUED | OUTPATIENT
Start: 2023-01-24 | End: 2023-01-25

## 2023-01-24 RX ORDER — PANTOPRAZOLE SODIUM 20 MG/1
40 TABLET, DELAYED RELEASE ORAL
Refills: 0 | Status: DISCONTINUED | OUTPATIENT
Start: 2023-01-24 | End: 2023-01-25

## 2023-01-24 RX ORDER — METOPROLOL TARTRATE 50 MG
25 TABLET ORAL DAILY
Refills: 0 | Status: DISCONTINUED | OUTPATIENT
Start: 2023-01-24 | End: 2023-01-25

## 2023-01-24 RX ORDER — SENNA PLUS 8.6 MG/1
2 TABLET ORAL AT BEDTIME
Refills: 0 | Status: DISCONTINUED | OUTPATIENT
Start: 2023-01-24 | End: 2023-01-25

## 2023-01-24 RX ORDER — ACETAMINOPHEN 500 MG
650 TABLET ORAL EVERY 6 HOURS
Refills: 0 | Status: DISCONTINUED | OUTPATIENT
Start: 2023-01-24 | End: 2023-01-25

## 2023-01-24 RX ORDER — POLYETHYLENE GLYCOL 3350 17 G/17G
17 POWDER, FOR SOLUTION ORAL DAILY
Refills: 0 | Status: DISCONTINUED | OUTPATIENT
Start: 2023-01-24 | End: 2023-01-25

## 2023-01-24 RX ORDER — LISINOPRIL 2.5 MG/1
1 TABLET ORAL
Qty: 0 | Refills: 0 | DISCHARGE

## 2023-01-24 RX ORDER — METOPROLOL TARTRATE 50 MG
1 TABLET ORAL
Qty: 0 | Refills: 0 | DISCHARGE

## 2023-01-24 RX ORDER — LANOLIN ALCOHOL/MO/W.PET/CERES
5 CREAM (GRAM) TOPICAL AT BEDTIME
Refills: 0 | Status: DISCONTINUED | OUTPATIENT
Start: 2023-01-24 | End: 2023-01-25

## 2023-01-24 RX ORDER — ESCITALOPRAM OXALATE 10 MG/1
1 TABLET, FILM COATED ORAL
Qty: 0 | Refills: 0 | DISCHARGE

## 2023-01-24 RX ORDER — ESCITALOPRAM OXALATE 10 MG/1
5 TABLET, FILM COATED ORAL DAILY
Refills: 0 | Status: DISCONTINUED | OUTPATIENT
Start: 2023-01-24 | End: 2023-01-25

## 2023-01-24 RX ORDER — SODIUM CHLORIDE 9 MG/ML
1000 INJECTION, SOLUTION INTRAVENOUS
Refills: 0 | Status: DISCONTINUED | OUTPATIENT
Start: 2023-01-24 | End: 2023-01-25

## 2023-01-24 RX ORDER — LISINOPRIL 2.5 MG/1
20 TABLET ORAL DAILY
Refills: 0 | Status: DISCONTINUED | OUTPATIENT
Start: 2023-01-24 | End: 2023-01-24

## 2023-01-24 RX ORDER — ONDANSETRON 8 MG/1
4 TABLET, FILM COATED ORAL EVERY 8 HOURS
Refills: 0 | Status: DISCONTINUED | OUTPATIENT
Start: 2023-01-24 | End: 2023-01-25

## 2023-01-24 RX ORDER — MORPHINE SULFATE 50 MG/1
2 CAPSULE, EXTENDED RELEASE ORAL EVERY 6 HOURS
Refills: 0 | Status: DISCONTINUED | OUTPATIENT
Start: 2023-01-24 | End: 2023-01-25

## 2023-01-24 RX ORDER — CHOLESTYRAMINE 4 G/9G
4 POWDER, FOR SUSPENSION ORAL
Refills: 0 | Status: DISCONTINUED | OUTPATIENT
Start: 2023-01-24 | End: 2023-01-25

## 2023-01-24 RX ORDER — AMLODIPINE BESYLATE 2.5 MG/1
1 TABLET ORAL
Qty: 0 | Refills: 0 | DISCHARGE

## 2023-01-24 RX ADMIN — CHOLESTYRAMINE 4 GRAM(S): 4 POWDER, FOR SUSPENSION ORAL at 22:54

## 2023-01-24 RX ADMIN — SENNA PLUS 2 TABLET(S): 8.6 TABLET ORAL at 21:52

## 2023-01-24 RX ADMIN — LISINOPRIL 20 MILLIGRAM(S): 2.5 TABLET ORAL at 21:34

## 2023-01-24 NOTE — H&P ADULT - HISTORY OF PRESENT ILLNESS
89F with PMHx of HTN, HLD, and dementia (AOx2 at baseline) presents to the ED for evaluation of abd pain and jaundice x2 weeks. Pt accompanied by her nephew at bedside. Pt and nephew report that over the past ~2 weeks, pt has been looking more jaundiced, and has been having occasional abdominal pain and fullness. Abdominal pain is moderate in severity, constant, slightly better after having bowel movements, not associated with nausea/vomiting. Pt endorses that her stool is lighter in color and that she's having pruritis and ~6lbs weight loss over the same period. She denies any hematemesis, hematochezia, or BRBPR; endorses mild constipation. Otherwise denies any fevers, chills, CP, SOB, palpitations, or any  symptoms.    In the ED: /88, HR 56, T 97.8F, RR 20 satting 97% on RA. Labs notable for Hb 11.8 at baseline, INR 1.04. Cr 1.1 BUN 22 also at baseline. Significantly elevated LFTs Tbili 8.6 (Direct 7.0), , , . UA moderate LE and blood but has epithelial cells and no bacteria, pt asymptomatic. CT abd/pelvis shows multiple gallstones. There is a 6.5 x 5.0 x 6.5 cm  heterogeneously enhancing, solid mass, predominantly within segments 4 and 5. The mass is inseparable from the gallbladder and extends into the anjali hepatis inseparable from the bile ducts. There is severe intrahepatic bile duct dilatation the right and left hepatic lobes. 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 The portal vein is patent. The hepatic veins are opacified.    Seen by Adv GI in ED; planned for EUS and ERCP tomorrow. Spoke to HCP Jillian Shannon who stated that Ms Lee is now DNR/DNI, and will continue discussions with palliative after the procedure tomorrow to likely transition to comfort care.

## 2023-01-24 NOTE — ED PROVIDER NOTE - OBJECTIVE STATEMENT
89-year-old female with PMH of HTN, HLD, dementia who presents with jaundice and abdominal pain.  Worsening x1 to 2 weeks.  Associate with epigastric and upper abdominal pain.  Nonradiating.  PMD Farzad spoke to Dr. Roque from GI and recommended to come to the ER.  Patient also endorsing pale stools and dark urine.  Patient and daughter bedside report no fevers, chills, nausea, vomiting, chest pain, shortness of breath, hematochezia, melena, trauma, falls, back pain.

## 2023-01-24 NOTE — ED ADULT NURSE NOTE - NSIMPLEMENTINTERV_GEN_ALL_ED
Implemented All Fall with Harm Risk Interventions:  [place] to call system. Call bell, personal items and telephone within reach. Instruct patient to call for assistance. Room bathroom lighting operational. Non-slip footwear when patient is off stretcher. Physically safe environment: no spills, clutter or unnecessary equipment. Stretcher in lowest position, wheels locked, appropriate side rails in place. Provide visual cue, wrist band, yellow gown, etc. Monitor gait and stability. Monitor for mental status changes and reorient to person, place, and time. Review medications for side effects contributing to fall risk. Reinforce activity limits and safety measures with patient and family. Provide visual clues: red socks.

## 2023-01-24 NOTE — ED PROVIDER NOTE - DIFFERENTIAL DIAGNOSIS
biliary/pancreatic tumor vs (less likely) hepatitis, choledocholithiasis, cholecystitis Differential Diagnosis

## 2023-01-24 NOTE — H&P ADULT - NSHPPHYSICALEXAM_GEN_ALL_CORE
General: NAD, AOx2  HEENT: Normocephalic, atraumatic, scleral icterus  Lungs: Normal breath sounds, no wheezes/crackles  Heart: S1s2, no mrg  Abdomen: Mild abd ttp, mild distention. No rigidity/guarding  Extremities: Peripheral pulses +1, no cyanosis/edema  Neuro: Grossly normal motor exam, no focal deficits  Psych: AOx2, normal affect, pleasant  Skin: Jaundiced

## 2023-01-24 NOTE — H&P ADULT - NSHPREVIEWOFSYSTEMS_GEN_ALL_CORE
CONSTITUTIONAL: (+) Generalized weakness, No fevers or chills  EYES/ENT: No visual changes;  No vertigo or throat pain   NECK: No pain or stiffness  RESPIRATORY: No cough, wheezing, hemoptysis; No shortness of breath  CARDIOVASCULAR: No chest pain or palpitations  GASTROINTESTINAL: (+) abdominal pain. No nausea, vomiting, or hematemesis; No diarrhea. (+) Mild constipation. No melena or hematochezia. (+) Pruritis  GENITOURINARY: No dysuria, frequency or hematuria  NEUROLOGICAL: No numbness or weakness  SKIN: (+) itching, (+) Jaundice

## 2023-01-24 NOTE — H&P ADULT - ASSESSMENT
89F with PMHx of HTN, HLD, and dementia (AOx2 at baseline) presents to the ED for evaluation of abd pain and jaundice x2 weeks. Found to have a significant solid tumor in biliary tree suspicious for cholangiocarcinoma vs gallbladder cancer.     #Gallbladder cancer vs cholangiocarcinoma   #Obstructive jaundice, abdominal pain, Pruritis  - Symptoms started 2 weeks ago: Pruritis, abdominal pain, jaundice, light colored stools, Wt loss 6 lbs, generalized weakness  - HD stable, afebrile, no leukocytosis  - LFTs: Tbili 8.6 (Direct 7.0), , , ; previous LFTs 1 year ago wnl  - CT AP w/ IVC: multiple gallstones. There is a 6.5 x 5.0 x 6.5 cm  heterogeneously enhancing, solid mass, predominantly within segments 4 and 5. The mass is inseparable from the gallbladder and extends into the anjali hepatis inseparable from the bile ducts. There is severe intrahepatic bile duct dilatation the right and left hepatic lobes. 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 The portal vein is patent. The hepatic veins are opacified.  - No signs of infection or sepsis, will hold off on abx now  - Seen by adv GI in ED: Planned for EUS/ERCP tomorrow  - Tylenol and morphine for pain regimen  - Start Senna and miralax prn  - Start cholestyramine for pruritis  - Keep NPO after midnight for procedure     #GOC  - Spoke with nephew at bedside and HCP Niece Mirtha over the phone, Mirtha will pass by tomorrow to sign [facility] for DNR/DNI, and explained to her current prognosis and she will likely opt for comfort care/hospice after ERCP  - Consult palliative tomorrow after ERCP     #HTN  - c/w metop succinate 25, lisinopril 20    #HLD  - Hold statin for now given elevated LFTs    #Dementia/depression  - c/w escitalopram 5mg qd    DVT ppx: SCDs for now, start heparin/lovenox after ERCP  GI ppx: Protonix  Diet: DASH/TLC, npo after midnight  Activity: IAT  Dispo: Acute  Code status: HCP Niece will come to the hospital to sign DNR/DNI, then palliative consult for likely hospice/CMO 89F with PMHx of HTN, HLD, and dementia (AOx2 at baseline) presents to the ED for evaluation of abd pain and jaundice x2 weeks. Found to have a significant solid tumor in biliary tree suspicious for cholangiocarcinoma vs gallbladder cancer.     #Gallbladder cancer vs cholangiocarcinoma   #Obstructive jaundice, abdominal pain, Pruritis  - Symptoms started 2 weeks ago: Pruritis, abdominal pain, jaundice, light colored stools, Wt loss 6 lbs, generalized weakness  - HD stable, afebrile, no leukocytosis  - LFTs: Tbili 8.6 (Direct 7.0), , , ; previous LFTs 1 year ago wnl  - CT AP w/ IVC: multiple gallstones. There is a 6.5 x 5.0 x 6.5 cm  heterogeneously enhancing, solid mass, predominantly within segments 4 and 5. The mass is inseparable from the gallbladder and extends into the anjali hepatis inseparable from the bile ducts. There is severe intrahepatic bile duct dilatation the right and left hepatic lobes. 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 The portal vein is patent. The hepatic veins are opacified.  - No signs of infection or sepsis, will hold off on abx now  - Seen by adv GI in ED: Planned for EUS/ERCP tomorrow  - Tylenol and morphine for pain regimen  - Start Senna and miralax prn  - Start cholestyramine for pruritis  - Keep NPO after midnight for procedure     #GOC  - Spoke with nephew at bedside and HCP Nijuwan Shannon over the phone, Mirtha will pass by tomorrow to sign [facility] for DNR/DNI, and explained to her current prognosis and she will likely opt for comfort care/hospice after ERCP  - Consult palliative tomorrow after ERCP     #HTN  - c/w metop succinate 25, lisinopril 20    #HLD  - Hold statin for now given elevated LFTs    #Dementia/depression  - c/w escitalopram 5mg qd    DVT ppx: SCDs for now, start heparin/lovenox after ERCP  GI ppx: Protonix  Diet: DASH/TLC, npo after midnight  Activity: IAT  Dispo: Acute  Code status: HCP Niece will come to the hospital to sign DNR/DNI, then palliative consult for likely hospice/CMO    Dr. Chip Sahni  Attending Hospitalist  89F with PMHx of HTN, HLD, and dementia (AOx2 at baseline) presents to the ED for evaluation of abd pain and jaundice x2 weeks. Found to have a significant solid tumor in biliary tree suspicious for cholangiocarcinoma vs gallbladder cancer.     #Gallbladder cancer vs cholangiocarcinoma   #Obstructive jaundice, abdominal pain, Pruritis  - Symptoms started 2 weeks ago: Pruritis, abdominal pain, jaundice, light colored stools, Wt loss 6 lbs, generalized weakness  - HD stable, afebrile, no leukocytosis  - LFTs: Tbili 8.6 (Direct 7.0), , , ; previous LFTs 1 year ago wnl  - CT AP w/ IVC: multiple gallstones. There is a 6.5 x 5.0 x 6.5 cm  heterogeneously enhancing, solid mass, predominantly within segments 4 and 5. The mass is inseparable from the gallbladder and extends into the anjali hepatis inseparable from the bile ducts. There is severe intrahepatic bile duct dilatation the right and left hepatic lobes. 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 The portal vein is patent. The hepatic veins are opacified.  - No signs of infection or sepsis, will hold off on abx now  - Seen by adv GI in ED: Planned for EUS/ERCP tomorrow  - Tylenol and morphine for pain regimen  - Start Senna and miralax prn  - Start cholestyramine for pruritis  - Keep NPO after midnight for procedure, IVF while NPO  - CXR shows possible RUL opacity; will hold off on CT chest with IV contrast for now until ERCP to determine if family wishes to pursue further workup    #GOC  - Spoke with nephew at bedside and HCP Niece Mirtha over the phone, Mirtha will pass by tomorrow to sign [facility] for DNR/DNI, and explained to her current prognosis and she will likely opt for comfort care/hospice after ERCP  - Consult palliative tomorrow after ERCP     #HTN  - c/w metop succinate 25, lisinopril 20    #HLD  - Hold statin for now given elevated LFTs    #Dementia/depression  - c/w escitalopram 5mg qd    DVT ppx: SCDs for now, start heparin/lovenox after ERCP  GI ppx: Protonix  Diet: DASH/TLC, npo after midnight  Activity: IAT  Dispo: Acute  Code status: HCP Niece will come to the hospital to sign DNR/DNI, then palliative consult for likely hospice/CMO    Dr. Chip Sahni  Attending Hospitalist

## 2023-01-24 NOTE — ED PROVIDER NOTE - PHYSICAL EXAMINATION
CONSTITUTIONAL: in no acute distress, afebrile  SKIN: Warm, dry, + jaundiced  EYES: + scleral icterus EOMI. PERRLA  ENT: No nasal discharge; oropharynx nonerythematous; airway clear  NECK: Supple; non tender  CARD:  Regular rate and rhythm  RESP: CTAB  ABD: Soft ND, mild epigastric ttp, no flank ttp; No guarding or rebound tenderness  EXT: Normal ROM.  No clubbing or cyanosis.  No edema. No calf tenderness  NEURO: A&O x2, grossly unremarkable, no focal deficits, moving all extremities

## 2023-01-24 NOTE — CHART NOTE - NSCHARTNOTEFT_GEN_A_CORE
Sent in by PMD Dr Panda who reached out to Dr Roque and the decision was made to send her in for jaundice. Please  - Obtain CBC, CMP, Tns, Coags, Lipase, and Covid swab  - Will need CT of abdomen and pelvis with and without contrast Pancreatic protcolo  - Full advanced GI consult to follow

## 2023-01-24 NOTE — ED PROVIDER NOTE - CLINICAL SUMMARY MEDICAL DECISION MAKING FREE TEXT BOX
Labs with elevated LFTs/bili in obstructive pattern. CT with biliary tumor, liver metastasis. Results were discussed with pt and her family at bedside. Will admit for GI and heme/onc eval, GOC planning.

## 2023-01-24 NOTE — ED PROVIDER NOTE - PROGRESS NOTE DETAILS
- Spoke to GI PA Ulices Palmer, recommending add medicine admission and will follow-up in patient for ERCP.  Labs showing direct hyperbilirubinemia.  Pending CT AH - CT showing biliary mass.  Spoke to GI NANCY Palmer, recommending medicine admission, possible intervention EUS/ERCP.  EKG reviewed PVCs. Signed out to ALICIA Gale

## 2023-01-24 NOTE — ED PROVIDER NOTE - ATTENDING CONTRIBUTION TO CARE
89-year-old woman, history of hypertension, hyperlipidemia, mild dementia, presents with upper abdominal discomfort associated with progressing jaundice over the last 2 weeks.  No nausea or vomiting, but patient reports light-colored stools, dark urine, and decreased appetite.  On exam, patient does appear jaundiced, though she looks younger than her stated age and is not distressed.  Lungs clear, CV S1-S2, abdomen soft, minimally tender in the epigastrium, no peritoneal signs.  Concern for pancreatic/biliary tumor, less likely hepatitis/pancreatitis/cholecystitis/choledocholithiasis.  Labs, imaging, reassess.

## 2023-01-24 NOTE — H&P ADULT - ATTENDING COMMENTS
89F with PMHx of HTN, HLD, and dementia (AOx2 at baseline) presents to the ED for evaluation of abd pain and jaundice x2 weeks. Found to have a significant solid tumor in biliary tree suspicious for cholangiocarcinoma vs gallbladder cancer.     #Gallbladder cancer vs cholangiocarcinoma   #Obstructive jaundice, abdominal pain, Pruritis  - Symptoms started 2 weeks ago: Pruritis, abdominal pain, jaundice, light colored stools, Wt loss 6 lbs, generalized weakness  - HD stable, afebrile, no leukocytosis  - LFTs: Tbili 8.6 (Direct 7.0), , , ; previous LFTs 1 year ago wnl  - CT AP w/ IVC: multiple gallstones. There is a 6.5 x 5.0 x 6.5 cm  heterogeneously enhancing, solid mass, predominantly within segments 4 and 5. The mass is inseparable from the gallbladder and extends into the anjali hepatis inseparable from the bile ducts. There is severe intrahepatic bile duct dilatation the right and left hepatic lobes. 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 The portal vein is patent. The hepatic veins are opacified.  - No signs of infection or sepsis, will hold off on abx now  - Seen by adv GI in ED: Planned for EUS/ERCP tomorrow  - Tylenol and morphine for pain regimen  - Start Senna and miralax prn  - Start cholestyramine for pruritis  - Keep NPO after midnight for procedure     #GOC  - Spoke with nephew at bedside and HCP Nijuwan Shannon over the phone, Mirtha will pass by tomorrow to sign [facility] for DNR/DNI, and explained to her current prognosis and she will likely opt for comfort care/hospice after ERCP  - Consult palliative tomorrow after ERCP     #HTN  - c/w metop succinate 25, lisinopril 20    #HLD  - Hold statin for now given elevated LFTs    #Dementia/depression  - c/w escitalopram 5mg qd    DVT ppx: SCDs for now, start heparin/lovenox after ERCP  GI ppx: Protonix  Diet: DASH/TLC, npo after midnight  Activity: IAT  Dispo: Acute  Code status: HCP Niece will come to the hospital to sign DNR/DNI, then palliative consult for likely hospice/CMO    Dr. Chip Sahni  Attending Hospitalist

## 2023-01-24 NOTE — H&P ADULT - NSHPLABSRESULTS_GEN_ALL_CORE
< from: CT Abdomen and Pelvis w/wo IV Cont (01.24.23 @ 14:18) >    INTERPRETATION:  REASON FOR EXAM / CLINICAL STATEMENT:  Abdominal pain.   Jaundice.  WBC 9.58 Elevated Bilirubin and LFTs.   PMHxof      TECHNIQUE:  Contiguous axial CT images were obtained from the diaphragms   through the pubic symphysis with IV contrast.  Reformatted images in the   coronal and sagittal planes were acquired.      COMPARISON CT: CT scan of the abdomen and pelvis dated 7/11/2021    OTHER STUDIES USED FOR CORRELATION: None.      TUBES AND LINES: None.    LOWER CHEST: There are reticular opacities at the lung bases. No pleural   or pericardial effusion.    HEPATOBILIARY:  The liver is normal in size.  Multiple calcified   gallstones are noted. There is a 6.5 x 5.0 x 6.5 cm heterogeneously   enhancing, solid mass, predominantly within segments 4 and 5. The mass is   inseparable from the gallbladder and extends into the anjali hepatis   inseparable from the bile ducts. There is severe intrahepatic bile duct   dilatation the right and left hepatic lobes. Findings are most suspicious   for cancer of biliary origin, either the gallbladder or   cholangiocarcinoma. The portal vein is patent. The hepatic veins are   opacified.    SPLEEN: Unremarkable.    PANCREAS: The pancreas is atrophic. No evidence of mass or pancreatitis.    ADRENAL GLANDS: Stable left adrenal gland calcification.    KIDNEYS: There is symmetric renal enhancement. No evidence of   hydronephrosis. Lower pole nonobstructing calyceal stones left kidney, up   to 1.9 cm. Right renal cyst.    ABDOMINOPELVIC NODES: Unremarkable.    PELVIC ORGANS: No evidence of pelvic mass, lymphadenopathy, or fluid   collection.    BLADDER: The bladder is decompressed limiting evaluation.    PERITONEUM/MESENTERY/BOWEL: No evidence of bowel obstruction, colitis,   inflammatory process, or ascites. No pneumoperitoneum.  The appendix is   normal in appearance.    BONES/SOFT TISSUES: Degenerative changes and scoliosis of the spine are   noted.    OTHER: Aortoiliac calcifications are noted with unchanged infrarenal   abdominal aortic ectasia.      IMPRESSION:    Multiple calcified gallstones are noted. There is a 6.5 x 5.0 x 6.5 cm   heterogeneously enhancing, solid mass, predominantly within segments 4   and 5. The mass is inseparable from the gallbladder and extends into the   anjali hepatis inseparable from the bile ducts. There is severe   intrahepatic bile duct dilatation the right and left hepatic lobes.   Findings are most suspicious for cancer of biliary origin, either the   gallbladder or cholangiocarcinoma. The portal vein is patent. The hepatic   veins are opacified.    Findings were discussed with Dr. Milad Cazares at 4:34 PM 1/24/2023, with   read back.    --- End of Report ---    < end of copied text >

## 2023-01-25 ENCOUNTER — TRANSCRIPTION ENCOUNTER (OUTPATIENT)
Age: 88
End: 2023-01-25

## 2023-01-25 VITALS
SYSTOLIC BLOOD PRESSURE: 127 MMHG | DIASTOLIC BLOOD PRESSURE: 61 MMHG | OXYGEN SATURATION: 97 % | TEMPERATURE: 96 F | HEART RATE: 61 BPM

## 2023-01-25 DIAGNOSIS — R17 UNSPECIFIED JAUNDICE: ICD-10-CM

## 2023-01-25 DIAGNOSIS — F03.90 UNSPECIFIED DEMENTIA WITHOUT BEHAVIORAL DISTURBANCE: ICD-10-CM

## 2023-01-25 DIAGNOSIS — Z51.5 ENCOUNTER FOR PALLIATIVE CARE: ICD-10-CM

## 2023-01-25 LAB
ALBUMIN SERPL ELPH-MCNC: 3.4 G/DL — LOW (ref 3.5–5.2)
ALP SERPL-CCNC: 774 U/L — HIGH (ref 30–115)
ALT FLD-CCNC: 135 U/L — HIGH (ref 0–41)
ANION GAP SERPL CALC-SCNC: 14 MMOL/L — SIGNIFICANT CHANGE UP (ref 7–14)
APTT BLD: 29.8 SEC — SIGNIFICANT CHANGE UP (ref 27–39.2)
AST SERPL-CCNC: 140 U/L — HIGH (ref 0–41)
BASOPHILS # BLD AUTO: 0.1 K/UL — SIGNIFICANT CHANGE UP (ref 0–0.2)
BASOPHILS NFR BLD AUTO: 1.1 % — HIGH (ref 0–1)
BILIRUB SERPL-MCNC: 8.5 MG/DL — HIGH (ref 0.2–1.2)
BUN SERPL-MCNC: 20 MG/DL — SIGNIFICANT CHANGE UP (ref 10–20)
CALCIUM SERPL-MCNC: 9.7 MG/DL — SIGNIFICANT CHANGE UP (ref 8.4–10.5)
CHLORIDE SERPL-SCNC: 105 MMOL/L — SIGNIFICANT CHANGE UP (ref 98–110)
CO2 SERPL-SCNC: 22 MMOL/L — SIGNIFICANT CHANGE UP (ref 17–32)
CREAT SERPL-MCNC: 1.1 MG/DL — SIGNIFICANT CHANGE UP (ref 0.7–1.5)
EGFR: 47 ML/MIN/1.73M2 — LOW
EOSINOPHIL # BLD AUTO: 0.15 K/UL — SIGNIFICANT CHANGE UP (ref 0–0.7)
EOSINOPHIL NFR BLD AUTO: 1.7 % — SIGNIFICANT CHANGE UP (ref 0–8)
GLUCOSE SERPL-MCNC: 92 MG/DL — SIGNIFICANT CHANGE UP (ref 70–99)
HCT VFR BLD CALC: 34.3 % — LOW (ref 37–47)
HGB BLD-MCNC: 11.1 G/DL — LOW (ref 12–16)
IMM GRANULOCYTES NFR BLD AUTO: 0.3 % — SIGNIFICANT CHANGE UP (ref 0.1–0.3)
INR BLD: 1.06 RATIO — SIGNIFICANT CHANGE UP (ref 0.65–1.3)
LYMPHOCYTES # BLD AUTO: 1.32 K/UL — SIGNIFICANT CHANGE UP (ref 1.2–3.4)
LYMPHOCYTES # BLD AUTO: 14.5 % — LOW (ref 20.5–51.1)
MAGNESIUM SERPL-MCNC: 2 MG/DL — SIGNIFICANT CHANGE UP (ref 1.8–2.4)
MCHC RBC-ENTMCNC: 25.8 PG — LOW (ref 27–31)
MCHC RBC-ENTMCNC: 32.4 G/DL — SIGNIFICANT CHANGE UP (ref 32–37)
MCV RBC AUTO: 79.6 FL — LOW (ref 81–99)
MONOCYTES # BLD AUTO: 1.04 K/UL — HIGH (ref 0.1–0.6)
MONOCYTES NFR BLD AUTO: 11.5 % — HIGH (ref 1.7–9.3)
NEUTROPHILS # BLD AUTO: 6.44 K/UL — SIGNIFICANT CHANGE UP (ref 1.4–6.5)
NEUTROPHILS NFR BLD AUTO: 70.9 % — SIGNIFICANT CHANGE UP (ref 42.2–75.2)
NRBC # BLD: 0 /100 WBCS — SIGNIFICANT CHANGE UP (ref 0–0)
PLATELET # BLD AUTO: 301 K/UL — SIGNIFICANT CHANGE UP (ref 130–400)
POTASSIUM SERPL-MCNC: 3.4 MMOL/L — LOW (ref 3.5–5)
POTASSIUM SERPL-SCNC: 3.4 MMOL/L — LOW (ref 3.5–5)
PROT SERPL-MCNC: 6 G/DL — SIGNIFICANT CHANGE UP (ref 6–8)
PROTHROM AB SERPL-ACNC: 12.1 SEC — SIGNIFICANT CHANGE UP (ref 9.95–12.87)
RBC # BLD: 4.31 M/UL — SIGNIFICANT CHANGE UP (ref 4.2–5.4)
RBC # FLD: 17 % — HIGH (ref 11.5–14.5)
SODIUM SERPL-SCNC: 141 MMOL/L — SIGNIFICANT CHANGE UP (ref 135–146)
WBC # BLD: 9.08 K/UL — SIGNIFICANT CHANGE UP (ref 4.8–10.8)
WBC # FLD AUTO: 9.08 K/UL — SIGNIFICANT CHANGE UP (ref 4.8–10.8)

## 2023-01-25 PROCEDURE — 99223 1ST HOSP IP/OBS HIGH 75: CPT

## 2023-01-25 PROCEDURE — 99497 ADVNCD CARE PLAN 30 MIN: CPT | Mod: 25

## 2023-01-25 PROCEDURE — 99239 HOSP IP/OBS DSCHRG MGMT >30: CPT

## 2023-01-25 RX ORDER — ACETAMINOPHEN 500 MG
2 TABLET ORAL
Qty: 0 | Refills: 0 | DISCHARGE
Start: 2023-01-25

## 2023-01-25 RX ORDER — ATORVASTATIN CALCIUM 80 MG/1
1 TABLET, FILM COATED ORAL
Qty: 0 | Refills: 0 | DISCHARGE

## 2023-01-25 RX ORDER — POTASSIUM CHLORIDE 20 MEQ
40 PACKET (EA) ORAL EVERY 4 HOURS
Refills: 0 | Status: DISCONTINUED | OUTPATIENT
Start: 2023-01-25 | End: 2023-01-25

## 2023-01-25 RX ORDER — POLYETHYLENE GLYCOL 3350 17 G/17G
17 POWDER, FOR SOLUTION ORAL
Qty: 510 | Refills: 0
Start: 2023-01-25 | End: 2023-02-23

## 2023-01-25 RX ORDER — CHOLESTYRAMINE 4 G/9G
4 POWDER, FOR SUSPENSION ORAL
Qty: 80 | Refills: 0
Start: 2023-01-25 | End: 2023-02-03

## 2023-01-25 RX ORDER — SENNA PLUS 8.6 MG/1
2 TABLET ORAL
Qty: 60 | Refills: 0
Start: 2023-01-25 | End: 2023-02-23

## 2023-01-25 RX ORDER — PANTOPRAZOLE SODIUM 20 MG/1
1 TABLET, DELAYED RELEASE ORAL
Qty: 30 | Refills: 0
Start: 2023-01-25 | End: 2023-02-23

## 2023-01-25 RX ORDER — POTASSIUM CHLORIDE 20 MEQ
20 PACKET (EA) ORAL
Refills: 0 | Status: COMPLETED | OUTPATIENT
Start: 2023-01-25 | End: 2023-01-25

## 2023-01-25 RX ORDER — NIFEDIPINE 30 MG
30 TABLET, EXTENDED RELEASE 24 HR ORAL ONCE
Refills: 0 | Status: COMPLETED | OUTPATIENT
Start: 2023-01-25 | End: 2023-01-25

## 2023-01-25 RX ADMIN — Medication 50 MILLIEQUIVALENT(S): at 10:50

## 2023-01-25 RX ADMIN — Medication 25 MILLIGRAM(S): at 05:09

## 2023-01-25 RX ADMIN — Medication 30 MILLIGRAM(S): at 00:19

## 2023-01-25 RX ADMIN — ESCITALOPRAM OXALATE 5 MILLIGRAM(S): 10 TABLET, FILM COATED ORAL at 13:30

## 2023-01-25 RX ADMIN — CHOLESTYRAMINE 4 GRAM(S): 4 POWDER, FOR SUSPENSION ORAL at 08:55

## 2023-01-25 NOTE — DISCHARGE NOTE NURSING/CASE MANAGEMENT/SOCIAL WORK - PATIENT PORTAL LINK FT
You can access the FollowMyHealth Patient Portal offered by [facility] by registering at the following website: [email or web address]. By joining [facility]’s FollowMyHealth portal, you will also be able to view your health information using other applications (apps) compatible with our system.

## 2023-01-25 NOTE — DISCHARGE NOTE NURSING/CASE MANAGEMENT/SOCIAL WORK - NSDCVIVACCINE_GEN_ALL_CORE_FT
Tdap; 11-Jul-2021 18:54; Genna Mandujano (RN); [employer]; [id number] (Exp. Date: 18-Nov-2022); IntraMuscular; Deltoid Left.; 0.5 milliLiter(s); VIS (VIS Published: 09-May-2013, VIS Presented: 11-Jul-2021);

## 2023-01-25 NOTE — DISCHARGE NOTE PROVIDER - ATTENDING DISCHARGE PHYSICAL EXAMINATION:
T(F): 97.5 (01-25-23 @ 12:53), Max: 98.4 (01-25-23 @ 05:00)  HR: 72 (01-25-23 @ 12:53) (48 - 73)  BP: 172/69 (01-25-23 @ 12:53) (137/63 - 223/108)  RR: 18 (01-25-23 @ 12:53) (18 - 99)  SpO2: 100% (01-24-23 @ 17:07) (100% - 100%)    Physical exam:   constitutional NAD, jaundiced, AAO, Respiratory  lungs CTA, CVS heart RRR, GI: abdomen Soft NT, ND, BS+  neuro exam non focal, gen weakness

## 2023-01-25 NOTE — CONSULT NOTE ADULT - CONVERSATION DETAILS
Spoke with patient and Damon at bedside, and later Ilda, who is decision maker, over the phone. Palliative care introduced. Ilda able to provide a good medical history and hospital course.  We discussed treatment options. She noted that she eventually wants the patient to come home on hospice. She is unsure if she wants to go through with the ERCP/EUS because she will have to rescind the DNR/DNI.    We discussed hospice and CMO at length. She wishes to visit and speak with the patient prior to a decision about the ERCP/EUS.    She wishes to make the patient DNR/DNI in the meantime and wishes for a hospice consult.

## 2023-01-25 NOTE — DISCHARGE NOTE PROVIDER - CARE PROVIDER_API CALL
Chong Roque)  Gastroenterology; Internal Medicine  [address] [place] [address]  Phone: [phone]  Fax: [phone]  Follow Up Time:

## 2023-01-25 NOTE — DISCHARGE NOTE PROVIDER - NSDCMRMEDTOKEN_GEN_ALL_CORE_FT
acetaminophen 325 mg oral tablet: 2 tab(s) orally every 6 hours, As needed, Temp greater or equal to 38C (100.4F), Mild Pain (1 - 3)  escitalopram 5 mg oral tablet: 1 tab(s) orally once a day  lisinopril 20 mg oral tablet: 1 tab(s) orally once a day  metoprolol succinate 25 mg oral tablet, extended release: 1 tab(s) orally once a day   acetaminophen 325 mg oral tablet: 2 tab(s) orally every 6 hours, As needed, Temp greater or equal to 38C (100.4F), Mild Pain (1 - 3)  cholestyramine 4 g/9 g oral powder for reconstitution: 4 gram(s) orally 2 times a day PRN for puritis  escitalopram 5 mg oral tablet: 1 tab(s) orally once a day  lisinopril 20 mg oral tablet: 1 tab(s) orally once a day  metoprolol succinate 25 mg oral tablet, extended release: 1 tab(s) orally once a day  pantoprazole 40 mg oral delayed release tablet: 1 tab(s) orally once a day (before a meal)  polyethylene glycol 3350 oral powder for reconstitution: 17 gram(s) orally once a day, As needed, Constipation  senna leaf extract oral tablet: 2 tab(s) orally once a day (at bedtime)

## 2023-01-25 NOTE — CONSULT NOTE ADULT - PROBLEM SELECTOR RECOMMENDATION 9
Obstructive jaundice with concern for malignancy. Possible ERCP/EUS.  Family considering foregoing ERCP/EUS.  Interested in hospice  -continue morphine 2mg IV q6h PRN for pain  -family will consider ERCP/EUS vs comfort measures only, with likely plan for hospice either way  -hospice consult placed  -f/u family's decision

## 2023-01-25 NOTE — CONSULT NOTE ADULT - SUBJECTIVE AND OBJECTIVE BOX
Patient is a 89 y/o pleasant female with PMHx of HTN, HLD, hearing loss, prior fall, dementia, who presented to the ED as noted to be jaundice by her PMD. Patient present but without family during interview. She notes to me occasional upper abdominal pain, describes it as a band that goes across her upper abdomen, that has been on and off for some time now. Pain is never overly sharp but can be moderate at times. She associates pain when she stands or ambulates for longer periods of time. She also notes pain tends to be more pronounced when she hasn't moved her bowel. She is not sure when she first became jaundiced but fairly recent as she is frequently around family. She denies nauseas, emesis, fever, chills, known weight loss, or known Pancreatic/Liver issues.     PAST MEDICAL & SURGICAL HISTORY:  HTN (hypertension)  HLD (hyperlipidemia)  Bilateral hearing loss, unspecified hearing loss type  Major depression  Cataract    Family Hx:  Father: Non Contributory   Mother: Non Contributory    Social History  Denies Current Tobacco use  Denies Current ETOH use  Denies Current Illicit Drug use     Allergies  No Known Allergies        Review of Systems  General:  Denies Fatigue, Denies Fever, Denies Weakness ,Denies Weight Loss   HEENT: Denies Trouble Swallowing ,Denies  Sore Throat , Denies Change in hearing/vision/speech ,Denies Dizziness    Cardio: Denies  Chest Pain , Palpitations    Respiratory: Denies worsening of SOB, Denies Cough  Abdomen: See detailed HPI  Neuro: Denies Headache Denies Dizziness, Denies Paresthesias  MSK: Denies pain in Bones/Joints/Muscles   Psych: Patient denies depression, denies suicidal or homicidal ideations  Integ: Patient Denies rash, or new skin lesions     Vital Signs Last 24 Hrs  T(C): 36.6 (2023 12:13), Max: 36.6 (2023 12:13)  T(F): 97.8 (2023 12:13), Max: 97.8 (2023 12:13)  HR: 56 (2023 12:13) (56 - 56)  BP: 134/88 (2023 12:13) (134/88 - 134/88)  BP(mean): --  RR: 20 (2023 12:13) (20 - 20)  SpO2: 97% (2023 12:13) (97% - 97%)    Parameters below as of 2023 12:13  Patient On (Oxygen Delivery Method): room air    Physical Exam  Gen: NAD  Head: NC/AT, no visible deformity  ENT: PERRLA, Sclera Icteric   Cardio: S1/S2 No S3/S4, Regular  Resp: CTA B/L  Abdomen: Soft, ND/TTP upper abdomen  on deep palpation   Neuro: AAOx3, Cranial Nerve II-XII intact   Extremities: FROM x 4  Skin: No jaundice, no excoriation       Labs:                          11.8   9.58  )-----------( 436      ( 2023 13:18 )             37.6       Auto Neutrophil %: 73.5 % (23 @ 13:18)  Auto Immature Granulocyte %: 0.4 % (23 @ 13:18)        140  |  106  |  22<H>  ----------------------------<  113<H>  3.8   |  20  |  1.1      Calcium, Total Serum: 10.2 mg/dL (23 @ 13:18)      LFTs:             6.8  | 8.6  | 173      ------------------[888     ( 2023 13:18 )  3.7  | 7.0  | 158         Lipase:39         Coags:     11.90  ----< 1.04    ( 2023 13:18 )     31.7        Urinalysis Basic - ( 2023 14:32 )    Color: Renetta / Appearance: Slightly Turbid / S.032 / pH: x  Gluc: x / Ketone: Negative  / Bili: Moderate / Urobili: <2 mg/dL   Blood: x / Protein: 30 mg/dL / Nitrite: Negative   Leuk Esterase: Moderate / RBC: 37 /HPF / WBC 20 /HPF   Sq Epi: x / Non Sq Epi: 7 /HPF / Bacteria: Negative      
CC:   abdominal pain and jaundice    HPI:  89F with PMHx of HTN, HLD, and dementia (AOx2 at baseline) presents to the ED for evaluation of abd pain and jaundice x2 weeks. Pt accompanied by her nephew at bedside. Pt and nephew report that over the past ~2 weeks, pt has been looking more jaundiced, and has been having occasional abdominal pain and fullness. Abdominal pain is moderate in severity, constant, slightly better after having bowel movements, not associated with nausea/vomiting. Pt endorses that her stool is lighter in color and that she's having pruritis and ~6lbs weight loss over the same period. She denies any hematemesis, hematochezia, or BRBPR; endorses mild constipation. Otherwise denies any fevers, chills, CP, SOB, palpitations, or any  symptoms.    In the ED: /88, HR 56, T 97.8F, RR 20 satting 97% on RA. Labs notable for Hb 11.8 at baseline, INR 1.04. Cr 1.1 BUN 22 also at baseline. Significantly elevated LFTs Tbili 8.6 (Direct 7.0), , , . UA moderate LE and blood but has epithelial cells and no bacteria, pt asymptomatic. CT abd/pelvis shows multiple gallstones. There is a 6.5 x 5.0 x 6.5 cm  heterogeneously enhancing, solid mass, predominantly within segments 4 and 5. The mass is inseparable from the gallbladder and extends into the anjali hepatis inseparable from the bile ducts. There is severe intrahepatic bile duct dilatation the right and left hepatic lobes. 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 The portal vein is patent. The hepatic veins are opacified.    Seen by Adv GI in ED; planned for EUS and ERCP tomorrow. Spoke to HCP Jillian Shannon who stated that Ms Lee is now DNR/DNI, and will continue discussions with palliative after the procedure tomorrow to likely transition to comfort care.  (2023 19:36)    PERTINENT PM/SXH:   HTN (hypertension)    HLD (hyperlipidemia)    Bilateral hearing loss, unspecified hearing loss type    Major depression    Cataract    FAMILY HISTORY:  Unable to obtain from the patient due to dementia    ITEMS NOT CHECKED ARE NOT PRESENT    SOCIAL HISTORY:   Significant other/partner[ ]  Children[ ]  Church/Spirituality:  Substance hx:  [ ]   Tobacco hx:  [ ]   Alcohol hx: [ ]   Living Situation: [x ]Home  [ ]Long term care  [ ]Rehab [ ]Other  Home Services: [x ] HHA [ ] Srikanth RN [ ] Hospice  Occupation:  Home Opioid hx:  [ ] Y [ ] N [ x] I-Stop Reference No: [id number]    ADVANCE DIRECTIVES:    [ ] Full Code [x ] DNR  MOLST  [ ]  Living Will  [ ]   DECISION MAKER(s):  [ ] Health Care Proxy(s)  [x ] Surrogate(s)  [ ] Guardian           Name(s): Phone Number(s):  Ilda Tomlinson (supposed HCP)    BASELINE (I)ADL(s) (prior to admission):  [place]: [ ]Total  [ ] Moderate [ ]Dependent  Palliative Performance Status Version 2:         50%    [email or web address]    Allergies  No Known Allergies    MEDICATIONS  (STANDING):  cholestyramine Powder (Sugar-Free) 4 Gram(s) Oral two times a day  escitalopram 5 milliGRAM(s) Oral daily  lactated ringers. 1000 milliLiter(s) (75 mL/Hr) IV Continuous <Continuous>  lisinopril 20 milliGRAM(s) Oral daily  metoprolol succinate ER 25 milliGRAM(s) Oral daily  pantoprazole    Tablet 40 milliGRAM(s) Oral before breakfast  senna 2 Tablet(s) Oral at bedtime    MEDICATIONS  (PRN):  acetaminophen     Tablet .. 650 milliGRAM(s) Oral every 6 hours PRN Temp greater or equal to 38C (100.4F), Mild Pain (1 - 3)  melatonin 5 milliGRAM(s) Oral at bedtime PRN Insomnia  morphine  - Injectable 2 milliGRAM(s) IV Push every 6 hours PRN Severe Pain (7 - 10)  ondansetron Injectable 4 milliGRAM(s) IV Push every 8 hours PRN Nausea and/or Vomiting  polyethylene glycol 3350 17 Gram(s) Oral daily PRN Constipation    PRESENT SYMPTOMS: [ ]Unable to obtain due to poor mentation   Source if other than patient:  [ ]Family   [ ]Team     Pain: [ ]yes [x ]no  QOL impact -   Location -                    Aggravating factors -  Quality -  Radiation -  Timing-  Severity (0-10 scale):  Minimal acceptable level (0-10 scale):     PAIN AD Score: 0    Dyspnea:                           [ ]Mild [ ]Moderate [ ]Severe [ x]None  Anxiety:                             [ ]Mild [ ]Moderate [ ]Severe [x ]None  Fatigue:                             [ ]Mild [ ]Moderate [ ]Severe [x ]None  Nausea:                             [ ]Mild [ ]Moderate [ ]Severe [x ]None  Loss of appetite:              [ ]Mild [ ]Moderate [ ]Severe [x ]None  Constipation:                    [ ]Mild [ ]Moderate [ ]Severe [x ]None    Other Symptoms:  [x ]All other review of systems negative     Palliative Performance Status Version 2:        40 %    [email or web address]    PHYSICAL EXAM:  Vital Signs Last 24 Hrs  T(C): 36.4 (2023 12:53), Max: 36.9 (2023 05:00)  T(F): 97.5 (2023 12:53), Max: 98.4 (2023 05:00)  HR: 72 (2023 12:53) (48 - 73)  BP: 172/69 (2023 12:53) (137/63 - 223/108)  BP(mean): --  RR: 18 (2023 12:53) (18 - 99)  SpO2: 100% (2023 17:07) (100% - 100%)    Parameters below as of 2023 21:15  Patient On (Oxygen Delivery Method): room air     I&O's Summary    GENERAL:  [ x] No acute distress [ ]Lethargic  [ ]Unarousable  [x ]Verbal  [ ]Non-Verbal [ ]Cachexia    BEHAVIORAL/PSYCH:  [x ]Alert and Oriented x2  [ ] Anxiety [ ] Delirium [ ] Agitation [ ] Calm   EYES: [ ] No scleral icterus [x ] Scleral icterus [ ] Closed  ENMT:  [ ]Dry mouth  [ x]No external oral lesions [ ] No external ear or nose lesions  CARDIOVASCULAR:  [ ]Regular [ ]Irregular [ ]Tachy [x ]Not Tachy  [ ]Shaggy [ ] Edema [ ] No edema  PULMONARY:  [ ]Tachypnea  [ ]Audible excessive secretions [x ] No labored breathing [ ] labored breathing  GASTROINTESTINAL: [ ]Soft  [ ]Distended  [ ]Not distended [ ]Non tender [ ]Tender  MUSCULOSKELETAL: [ ]No clubbing [ ] clubbing  [x ] No cyanosis [ ] cyanosis  NEUROLOGIC: [ ]No focal deficits  [x ]Follows commands  [ ]Does not follow commands  [ ]Cognitive impairment  [ ]Dysphagia  [ ]Dysarthria  [ ]Paresis   SKIN: [ ] Jaundiced [ ] Non-jaundiced [ ]Rash [ x]No Rash [ ] Warm [ ] Dry  MISC/LINES: [ ] ET tube [ ] Trach [ ]NGT/OGT [ ]PEG [ ]Snell      LABS: reviewed by me                        11.1   9.08  )-----------( 301      ( 2023 06:41 )             34.3       141  |  105  |  20  ----------------------------<  92  3.4<L>   |  22  |  1.1    Ca    9.7      2023 06:41  Mg     2.0         TPro  6.0  /  Alb  3.4<L>  /  TBili  8.5<H>  /  DBili  x   /  AST  140<H>  /  ALT  135<H>  /  AlkPhos  774<H>    PT/INR - ( 2023 06:41 )   PT: 12.10 sec;   INR: 1.06 ratio         PTT - ( 2023 06:41 )  PTT:29.8 sec    Urinalysis Basic - ( 2023 14:32 )    Color: Renetta / Appearance: Slightly Turbid / S.032 / pH: x  Gluc: x / Ketone: Negative  / Bili: Moderate / Urobili: <2 mg/dL   Blood: x / Protein: 30 mg/dL / Nitrite: Negative   Leuk Esterase: Moderate / RBC: 37 /HPF / WBC 20 /HPF   Sq Epi: x / Non Sq Epi: 7 /HPF / Bacteria: Negative      RADIOLOGY & ADDITIONAL STUDIES: reviewed by me  < from: CT Abdomen and Pelvis w/wo IV Cont (23 @ 14:18) >  IMPRESSION:    Multiple calcified gallstones are noted. There is a 6.5 x 5.0 x 6.5 cm   heterogeneously enhancing, solid mass, predominantly within segments 4   and 5. The mass is inseparable from the gallbladder and extends into the   anjali hepatis inseparable from the bile ducts. There is severe   intrahepatic bile duct dilatation the right and left hepatic lobes.   Findings are most suspicious for cancer of biliary origin, either the   gallbladder or cholangiocarcinoma. The portal vein is patent. The hepatic   veins are opacified.    Findings were discussed with Dr. Milad Cazares at 4:34 PM 2023, with   read back.    < end of copied text >      EKG: reviewed by me  < from: 12 Lead ECG (23 @ 16:47) >  Ventricular Rate 71 BPM    Atrial Rate 71 BPM    P-R Interval 176 ms    QRS Duration 102 ms    Q-T Interval 438 ms    QTC Calculation(Bazett) 475 ms    P Axis 55 degrees    R Axis -28 degrees    T Axis 89 degrees    Diagnosis Line Sinus rhythm withfrequent and consecutive Premature ventricular complexes  Left ventricular hypertrophy with repolarization abnormality  Abnormal ECG    < end of copied text >      PROTEIN CALORIE MALNUTRITION PRESENT: [ ]mild [ ]moderate [ ]severe [ ]underweight [ ]morbid obesity  https://www.andeal.org/vault/2440/web/files/ONC/Table_Clinical%20Characteristics%20to%20Document%20Malnutrition-White%20JV%20et%20al%2020.pdf    Height (cm): 170.2 (22 @ 06:55)  Weight (kg): 63.5 (22 @ 06:55)  BMI (kg/m2): 21.9 (22 @ 06:55)    [ ]PPSV2 < or = to 30% [ ]significant weight loss  [ ]poor nutritional intake  [ ]anasarca      [ ]Artificial Nutrition      REFERRALS:   [ ]Chaplaincy  [x ]Hospice  [ ]Child Life  [x ]Social Work  [ ]Case management [ ]Holistic Therapy     Goals of Care Document:

## 2023-01-25 NOTE — PRE-OP CHECKLIST - PATIENT'S PERSONAL PROPERTY GIVEN TO
Claudette Daylene Oka presents today for   Chief Complaint   Patient presents with    New Patient       Claudette Daylene Oka preferred language for health care discussion is english/other. Personal Protective Equipment:   Personal Protective Equipment was used including: mask-surgical and hands-gloves. Patient was placed on no precaution(s). Patient was masked. Precautions:   Patient currently on None  Patient currently roomed with door closed    Is someone accompanying this pt? Yes male    Is the patient using any DME equipment during OV? Yes cane    Depression Screening:  3 most recent PHQ Screens 5/20/2021   Little interest or pleasure in doing things Not at all   Feeling down, depressed, irritable, or hopeless Not at all   Total Score PHQ 2 0       Learning Assessment:  No flowsheet data found. Abuse Screening:  Abuse Screening Questionnaire 5/4/2021   Do you ever feel afraid of your partner? N   Are you in a relationship with someone who physically or mentally threatens you? N   Is it safe for you to go home? Y       Fall Risk  Fall Risk Assessment, last 12 mths 5/4/2021   Able to walk? Yes   Fall in past 12 months? 0   Do you feel unsteady? 0   Are you worried about falling 0       Pt currently taking Anticoagulant therapy? no    Coordination of Care:  1. Have you been to the ER, urgent care clinic since your last visit? Hospitalized since your last visit? NP    2. Have you seen or consulted any other health care providers outside of the [address] [place] [address] since your last visit? Include any pap smears or colon screening.  no on unit

## 2023-01-25 NOTE — DISCHARGE NOTE PROVIDER - NSDCCPCAREPLAN_GEN_ALL_CORE_FT
PRINCIPAL DISCHARGE DIAGNOSIS  Diagnosis: Biliary obstruction  Assessment and Plan of Treatment: you were admitted for biliary obstructive most likely from malignancy.  You were planned for EUS and ERCP by GI on 1/26th   However the family decided on discharging the patient home with hospice. Procedures, imagings, and labs cancelled and the patient will be discharged home with hospice with the appropriate comfort measures taken.      SECONDARY DISCHARGE DIAGNOSES  Diagnosis: Jaundice  Assessment and Plan of Treatment:

## 2023-01-25 NOTE — CONSULT NOTE ADULT - PROBLEM SELECTOR RECOMMENDATION 3
-now DNR/DNI  -new MOLST filled out and placed in chart  -hospice consult placed  -treatment options, including CMO, discussed at length  -Ilda will visit today and discuss options with rest of family  -will follow

## 2023-01-25 NOTE — CONSULT NOTE ADULT - ASSESSMENT
Patient is a 89 y/o pleasant female with PMHx of HTN, HLD, hearing loss, prior fall, dementia, who presented to the ED as noted to be jaundice by her PMD. Patient present but without family during interview. She notes to me occasional upper abdominal pain, describes it as a band that goes across her upper abdomen, that has been on and off for some time now. Patient also presents with new jaundice. She has had prior CT scan s/p fall which noted Gallbladder with many stones. While it is somewhat reassuring that she has some pain with jaundice CT imaging as per my read demonstrates a porcelain appearing Gallbladder, some lesions on the liver, and definite ductal dilation. Will await an official read but will likely benefit from Endoscopic decompression along with possible EUS FNA for tissue sampling if aligned with GOC of family.     Jaundice/ Abdominal Pain  - T job 8.2  - Absent WBC elevations nor temperature  - CT- my read- Calcified appearing Gallbladder, liver appears to have lesions, and ductal dilation noted- Await official read  - Clinically stable  - Would admit to medicine for tentative EUS/ ERCP after GOC takes place and CT is officially read but again patient HD stable   - NPO after midnight  - INR 1.04, with an Active TnS  - Will follow and try to see if family present again later today if not will call them 
89 year old woman with history of dementia, HTN, HLD presents with abdominal pain and jaundice.  Hospital course complicated by evidence of new GB cancer vs cholangiocarcinoma on imaging, obstructive jaundince, abdominal pain. Palliative care consulted for GOC.     Spoke with patient and Damon at bedside, and later Ilda, who is decision maker, over the phone. Palliative care introduced. Ilda able to provide a good medical history and hospital course.  We discussed treatment options. She noted that she eventually wants the patient to come home on hospice. She is unsure if she wants to go through with the ERCP/EUS because she will have to rescind the DNR/DNI.    We discussed hospice and CMO at length. She wishes to visit and speak with the patient prior to a decision about the ERCP/EUS.    She wishes to make the patient DNR/DNI in the meantime and wishes for a hospice consult.    MEDD (morphine equivalent daily dose):      See Recs below.    Please call [phone] with questions or concerns 24/7.   We will continue to follow.     Discussed with primary MD.

## 2023-01-25 NOTE — DISCHARGE NOTE PROVIDER - HOSPITAL COURSE
89F with PMHx of HTN, HLD, and dementia (AOx2 at baseline) presents to the ED for evaluation of abd pain and jaundice x2 weeks. Pt accompanied by her nephew at bedside. Pt and nephew report that over the past ~2 weeks, pt has been looking more jaundiced, and has been having occasional abdominal pain and fullness. Abdominal pain is moderate in severity, constant, slightly better after having bowel movements, not associated with nausea/vomiting. Pt endorses that her stool is lighter in color and that she's having pruritis and ~6lbs weight loss over the same period. She denies any hematemesis, hematochezia, or BRBPR; endorses mild constipation. Otherwise denies any fevers, chills, CP, SOB, palpitations, or any  symptoms.    In the ED: /88, HR 56, T 97.8F, RR 20 satting 97% on RA. Labs notable for Hb 11.8 at baseline, INR 1.04. Cr 1.1 BUN 22 also at baseline. Significantly elevated LFTs Tbili 8.6 (Direct 7.0), , , . UA moderate LE and blood but has epithelial cells and no bacteria, pt asymptomatic. CT abd/pelvis shows multiple gallstones. There is a 6.5 x 5.0 x 6.5 cm  heterogeneously enhancing, solid mass, predominantly within segments 4 and 5. The mass is inseparable from the gallbladder and extends into the anjali hepatis inseparable from the bile ducts. There is severe intrahepatic bile duct dilatation the right and left hepatic lobes. 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 The portal vein is patent. The hepatic veins are opacified.    Seen by Adv GI in ED; planned for EUS and ERCP. 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 There is a 6.5 x 5.0 x 6.5 cm  heterogeneously enhancing, solid mass, predominantly within segments 4 and 5. The mass is inseparable from the gallbladder and extends into the anjali hepatis inseparable from the bile ducts. There is severe intrahepatic bile duct dilatation the right and left hepatic lobes. 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 The portal vein is patent. The hepatic veins are opacified.  - No signs of infection or sepsis, will hold off on abx now  - Seen by adv GI in ED: Planned for EUS/ERCP   - Tylenol and morphine for pain regimen  - Started Senna and miralax prn  - Started cholestyramine for pruritis  - CXR shows possible RUL opacity; will hold off on CT chest with IV contrast for now until ERCP to determine if family wishes to pursue further workup    #GOC  Spoke with her health care proxy Ilda and the family decided on discharging the patient home with hospice and they decided against any medical intervention.  ERCP/EUS cancelled, no further imaging or lab testing  Patient will be discharged home with appropriate comfort care measures

## 2023-01-30 DIAGNOSIS — K80.71 CALCULUS OF GALLBLADDER AND BILE DUCT WITHOUT CHOLECYSTITIS WITH OBSTRUCTION: ICD-10-CM

## 2023-01-30 DIAGNOSIS — H91.93 UNSPECIFIED HEARING LOSS, BILATERAL: ICD-10-CM

## 2023-01-30 DIAGNOSIS — E78.5 HYPERLIPIDEMIA, UNSPECIFIED: ICD-10-CM

## 2023-01-30 DIAGNOSIS — C22.1 INTRAHEPATIC BILE DUCT CARCINOMA: ICD-10-CM

## 2023-01-30 DIAGNOSIS — K59.00 CONSTIPATION, UNSPECIFIED: ICD-10-CM

## 2023-01-30 DIAGNOSIS — Z79.82 LONG TERM (CURRENT) USE OF ASPIRIN: ICD-10-CM

## 2023-01-30 DIAGNOSIS — F32.A DEPRESSION, UNSPECIFIED: ICD-10-CM

## 2023-01-30 DIAGNOSIS — Z66 DO NOT RESUSCITATE: ICD-10-CM

## 2023-01-30 DIAGNOSIS — F03.90 UNSPECIFIED DEMENTIA, UNSPECIFIED SEVERITY, WITHOUT BEHAVIORAL DISTURBANCE, PSYCHOTIC DISTURBANCE, MOOD DISTURBANCE, AND ANXIETY: ICD-10-CM

## 2023-01-30 DIAGNOSIS — Z20.822 CONTACT WITH AND (SUSPECTED) EXPOSURE TO COVID-19: ICD-10-CM

## 2023-01-30 DIAGNOSIS — K83.1 OBSTRUCTION OF BILE DUCT: ICD-10-CM

## 2023-01-30 DIAGNOSIS — I10 ESSENTIAL (PRIMARY) HYPERTENSION: ICD-10-CM

## 2023-04-04 NOTE — ED PROCEDURE NOTE - CPROC ED LACERATION CLEANSED1
Last OV 10/13/22  Last refill 3/6/23  I have reviewed the patients controlled substance prescription history, as maintained in the [place] prescription monitoring program, so that the prescription(s) for a  controlled substance can be given. cleansed

## 2024-04-24 NOTE — ASU PREOP CHECKLIST - HAND OFF
No lifting greater than 15 pounds for 2 weeks  Nothing in the vagina for 6 weeks  No lotions to dermabond or steristrips  Shower only for 6 weeks  No driving for 2 weeks.     Holding RN to OR RN